# Patient Record
Sex: MALE | Race: WHITE | NOT HISPANIC OR LATINO | Employment: FULL TIME | URBAN - METROPOLITAN AREA
[De-identification: names, ages, dates, MRNs, and addresses within clinical notes are randomized per-mention and may not be internally consistent; named-entity substitution may affect disease eponyms.]

---

## 2021-04-06 ENCOUNTER — APPOINTMENT (OUTPATIENT)
Dept: RADIOLOGY | Age: 56
End: 2021-04-06
Payer: COMMERCIAL

## 2021-04-06 ENCOUNTER — OFFICE VISIT (OUTPATIENT)
Dept: URGENT CARE | Age: 56
End: 2021-04-06
Payer: COMMERCIAL

## 2021-04-06 VITALS
BODY MASS INDEX: 28.63 KG/M2 | RESPIRATION RATE: 18 BRPM | HEART RATE: 83 BPM | OXYGEN SATURATION: 99 % | WEIGHT: 200 LBS | TEMPERATURE: 97.9 F | DIASTOLIC BLOOD PRESSURE: 79 MMHG | SYSTOLIC BLOOD PRESSURE: 118 MMHG | HEIGHT: 70 IN

## 2021-04-06 DIAGNOSIS — M25.511 ACUTE PAIN OF RIGHT SHOULDER: ICD-10-CM

## 2021-04-06 DIAGNOSIS — S43.401A SPRAIN OF RIGHT SHOULDER, UNSPECIFIED SHOULDER SPRAIN TYPE, INITIAL ENCOUNTER: Primary | ICD-10-CM

## 2021-04-06 PROCEDURE — 96372 THER/PROPH/DIAG INJ SC/IM: CPT | Performed by: PHYSICIAN ASSISTANT

## 2021-04-06 PROCEDURE — 99213 OFFICE O/P EST LOW 20 MIN: CPT | Performed by: PHYSICIAN ASSISTANT

## 2021-04-06 PROCEDURE — 73030 X-RAY EXAM OF SHOULDER: CPT

## 2021-04-06 RX ORDER — KETOROLAC TROMETHAMINE 30 MG/ML
60 INJECTION, SOLUTION INTRAMUSCULAR; INTRAVENOUS ONCE
Status: COMPLETED | OUTPATIENT
Start: 2021-04-06 | End: 2021-04-06

## 2021-04-06 RX ORDER — METHYLPREDNISOLONE 4 MG/1
TABLET ORAL
Qty: 1 EACH | Refills: 0 | Status: SHIPPED | OUTPATIENT
Start: 2021-04-06 | End: 2021-09-21 | Stop reason: HOSPADM

## 2021-04-06 RX ORDER — KETOROLAC TROMETHAMINE 10 MG/1
10 TABLET, FILM COATED ORAL EVERY 8 HOURS
Qty: 15 TABLET | Refills: 0 | Status: SHIPPED | OUTPATIENT
Start: 2021-04-06 | End: 2021-09-21 | Stop reason: HOSPADM

## 2021-04-06 RX ADMIN — KETOROLAC TROMETHAMINE 60 MG: 30 INJECTION, SOLUTION INTRAMUSCULAR; INTRAVENOUS at 09:51

## 2021-04-06 NOTE — PROGRESS NOTES
[facility] [employer] [facility]        NAME: Pablo Chin is a 64 y o  male  : 1965    MRN: [id number]  DATE: 2021  TIME: 9:54 AM    Assessment and Plan   Sprain of right shoulder, unspecified shoulder sprain type, initial encounter [S43 401A]  1  Sprain of right shoulder, unspecified shoulder sprain type, initial encounter  XR shoulder 2+ vw right    ketorolac (TORADOL) injection 60 mg    methylPREDNISolone 4 MG tablet therapy pack    ketorolac (TORADOL) 10 mg tablet     Right Shoulder XRAY: Negative for acute osseous abnormality  Pending Radiologist Interpretation  Patient received Toradol 60mg IM while in clinic  Sling applied  Discussed need for Ortho f/u if no improvement  Pt stable upon d/c  Patient Instructions       Follow up with Orthopedics if symptoms do not improve  Take medications as prescribed  Wear brace/sling as directed  Proceed to  ER if symptoms worsen  Chief Complaint     Chief Complaint   Patient presents with    Shoulder Pain     pt states yesterday he "popped" his right shoulder when pulling himself by grabbing onto a pipe  decreased ROM           History of Present Illness       Patient is c/o right shoulder pain  Patient reports laying supine and attempting to grab a bar to lift himself back and felt a pop to right shoulder  Pain to right anterior shoulder since that time  Limited ROM  Pt denies previous shoulder injuries  No hx of dislocation or fracture  Shoulder Pain   The pain is present in the right shoulder  This is a new problem  The current episode started yesterday  The problem occurs constantly  The problem has been gradually worsening  The quality of the pain is described as aching  The pain is moderate  Pertinent negatives include no fever  He has tried NSAIDS for the symptoms  The treatment provided no relief  Review of Systems   Review of Systems   Constitutional: Negative for chills and fever  HENT: Negative for ear pain and sore throat  Eyes: Negative for pain and visual disturbance  Respiratory: Negative for cough and shortness of breath  Cardiovascular: Negative for chest pain and palpitations  Gastrointestinal: Negative for abdominal pain and vomiting  Genitourinary: Negative for dysuria and hematuria  Musculoskeletal: Positive for arthralgias (Right shoulder pain)  Negative for back pain  Skin: Negative for color change and rash  Neurological: Negative for seizures and syncope  All other systems reviewed and are negative  Current Medications       Current Outpatient Medications:     ketorolac (TORADOL) 10 mg tablet, Take 1 tablet (10 mg total) by mouth every 8 (eight) hours for 5 days, Disp: 15 tablet, Rfl: 0    methylPREDNISolone 4 MG tablet therapy pack, Use as directed on package, Disp: 1 each, Rfl: 0    Current Facility-Administered Medications:     ketorolac (TORADOL) injection 60 mg, 60 mg, Intramuscular, Once, Devendra Yan PA-C    Current Allergies     Allergies as of 04/06/2021    (No Known Allergies)            The following portions of the patient's history were reviewed and updated as appropriate: allergies, current medications, past family history, past medical history, past social history, past surgical history and problem list      History reviewed  No pertinent past medical history  Past Surgical History:   Procedure Laterality Date    ACHILLES TENDON SURGERY      LAMINECTOMY         History reviewed  No pertinent family history  Medications have been verified  Objective   /79   Pulse 83   Temp 97 9 °F (36 6 °C)   Resp 18   Ht 5' 10" (1 778 m)   Wt 90 7 kg (200 lb)   SpO2 99%   BMI 28 70 kg/m²   No LMP for male patient  Physical Exam     Physical Exam  Constitutional:       Appearance: Normal appearance  He is normal weight  HENT:      Head: Normocephalic and atraumatic        Nose: Nose normal       Mouth/Throat:      Mouth: Mucous membranes are moist  Eyes:      Extraocular Movements: Extraocular movements intact  Conjunctiva/sclera: Conjunctivae normal       Pupils: Pupils are equal, round, and reactive to light  Neck:      Musculoskeletal: Normal range of motion and neck supple  Cardiovascular:      Rate and Rhythm: Normal rate  Pulmonary:      Effort: Pulmonary effort is normal    Musculoskeletal: Normal range of motion  Arms:    Skin:     General: Skin is warm and dry  Neurological:      General: No focal deficit present  Mental Status: He is alert and oriented to person, place, and time     Psychiatric:         Mood and Affect: Mood normal          Behavior: Behavior normal

## 2021-04-06 NOTE — PATIENT INSTRUCTIONS
Follow up with Orthopedics if symptoms do not improve within 1 week  ED if symptoms worsen  Take medications as prescribed  Avoid any heavy lifting until symptoms improve  Wear sling as directed  Shoulder Sprain   WHAT YOU NEED TO KNOW:   A shoulder sprain happens when a ligament in your shoulder is stretched or torn  Ligaments are the tough tissues that connect bones  Ligaments allow you to lift, lower, and rotate your arm  DISCHARGE INSTRUCTIONS:   Return to the emergency department if:   · You feel severe pain in your shoulder when you move it, or it is touched  · Your skin feels cold or clammy  · You have numbness, tingling, or a feeling of pins and needles in your shoulder  · The skin on your injured shoulder looks blue or pale  Call your doctor if:   · You have new or increased swelling and pain in your shoulder  · You have new or increased stiffness when you move your injured shoulder  · Your symptoms do not improve within 5 to 7 days  · You have questions or concerns about your condition or care  Medicines: You may need any of the following:  · Acetaminophen  decreases pain and fever  It is available without a doctor's order  Ask how much to take and how often to take it  Follow directions  Read the labels of all other medicines you are using to see if they also contain acetaminophen, or ask your doctor or pharmacist  Acetaminophen can cause liver damage if not taken correctly  Do not use more than 4 grams (4,000 milligrams) total of acetaminophen in one day  · NSAIDs , such as ibuprofen, help decrease swelling, pain, and fever  This medicine is available with or without a doctor's order  NSAIDs can cause stomach bleeding or kidney problems in certain people  If you take blood thinner medicine, always ask your healthcare provider if NSAIDs are safe for you  Always read the medicine label and follow directions  · Prescription pain medicine  may be given   Ask your healthcare provider how to take this medicine safely  Some prescription pain medicines contain acetaminophen  Do not take other medicines that contain acetaminophen without talking to your healthcare provider  Too much acetaminophen may cause liver damage  Prescription pain medicine may cause constipation  Ask your healthcare provider how to prevent or treat constipation  · Take your medicine as directed  Contact your healthcare provider if you think your medicine is not helping or if you have side effects  Tell him or her if you are allergic to any medicine  Keep a list of the medicines, vitamins, and herbs you take  Include the amounts, and when and why you take them  Bring the list or the pill bottles to follow-up visits  Carry your medicine list with you in case of an emergency  Follow up with your healthcare provider as directed:  Write down your questions so you remember to ask them during your visits  Self-care:   · Rest  your shoulder so it can heal  Avoid moving your shoulder as your injury heals  This will help decrease the risk of more damage to your shoulder  · Apply ice  on your shoulder for 20 to 30 minutes every 2 hours or as directed  Use an ice pack, or put crushed ice in a plastic bag  Cover it with a towel before you apply it to your shoulder  Ice helps prevent tissue damage and decreases swelling and pain  · Compress your shoulder as directed  Compression provides support and helps decrease swelling and movement so your shoulder can heal  For mild sprains, you may be given a sling to support your arm  You may need a padded brace or a plaster cast to hold your shoulder in place if the sprain is more serious  How to wear a brace, sling, or splint:  A brace, sling, or splint may be needed to limit your movement and protect your injured shoulder  · Wear your brace, sling, or splint as directed  You may need to wear it all the time and take it off only to bathe or do exercises  Ask your healthcare provider how long you should wear it  · Keep your skin clean and dry  Padding under your armpit will help absorb sweat and prevent sores on your skin  · Do not hunch your shoulders  This may cause pain  Keep your shoulders relaxed  · Position the sling over your arm and hand so that it also covers your knuckles  This will help the sling support your wrist and hand  Position your wrist higher than your elbow  Your wrist may start to hurt or go numb if your sling is too short  Physical therapy:  A physical therapist teaches you exercises to help improve movement and strength, and to decrease pain  Prevent another injury:   · Do not exercise when you are tired or in pain  Warm up and stretch before you exercise  · Wear equipment to protect yourself when you play sports  · Wear shoes that fit well and run on flat surfaces to prevent falls  © Copyright [address] Information is for End User's use only and may not be sold, redistributed or otherwise used for commercial purposes  All illustrations and images included in CareNotes® are the copyrighted property of [employer] D [employer]  or [facility] Jaleel Zamorano   The above information is an  only  It is not intended as medical advice for individual conditions or treatments  Talk to your doctor, nurse or pharmacist before following any medical regimen to see if it is safe and effective for you

## 2021-09-19 ENCOUNTER — APPOINTMENT (EMERGENCY)
Dept: RADIOLOGY | Facility: HOSPITAL | Age: 56
End: 2021-09-19
Payer: COMMERCIAL

## 2021-09-19 ENCOUNTER — HOSPITAL ENCOUNTER (INPATIENT)
Facility: HOSPITAL | Age: 56
LOS: 1 days | Discharge: HOME | DRG: 552 | End: 2021-09-21
Attending: SURGERY | Admitting: SURGERY
Payer: COMMERCIAL

## 2021-09-19 ENCOUNTER — APPOINTMENT (EMERGENCY)
Dept: CT IMAGING | Facility: HOSPITAL | Age: 56
End: 2021-09-19
Payer: COMMERCIAL

## 2021-09-19 ENCOUNTER — HOSPITAL ENCOUNTER (EMERGENCY)
Facility: HOSPITAL | Age: 56
End: 2021-09-19
Attending: EMERGENCY MEDICINE | Admitting: EMERGENCY MEDICINE
Payer: COMMERCIAL

## 2021-09-19 ENCOUNTER — APPOINTMENT (OUTPATIENT)
Dept: RADIOLOGY | Facility: HOSPITAL | Age: 56
DRG: 552 | End: 2021-09-19
Payer: COMMERCIAL

## 2021-09-19 ENCOUNTER — APPOINTMENT (EMERGENCY)
Dept: RADIOLOGY | Facility: HOSPITAL | Age: 56
DRG: 552 | End: 2021-09-19
Payer: COMMERCIAL

## 2021-09-19 VITALS
SYSTOLIC BLOOD PRESSURE: 136 MMHG | OXYGEN SATURATION: 98 % | RESPIRATION RATE: 16 BRPM | DIASTOLIC BLOOD PRESSURE: 90 MMHG | TEMPERATURE: 97.6 F | HEART RATE: 83 BPM

## 2021-09-19 DIAGNOSIS — S12.591A OTHER CLOSED NONDISPLACED FRACTURE OF SIXTH CERVICAL VERTEBRA, INITIAL ENCOUNTER (HCC): Primary | ICD-10-CM

## 2021-09-19 DIAGNOSIS — K08.419 PARTIAL LOSS OF TOOTH DUE TO TRAUMA: ICD-10-CM

## 2021-09-19 DIAGNOSIS — S20.212A HEMATOMA OF LEFT CHEST WALL, INITIAL ENCOUNTER: ICD-10-CM

## 2021-09-19 DIAGNOSIS — S22.080A COMPRESSION FRACTURE OF T12 VERTEBRA, INITIAL ENCOUNTER (HCC): ICD-10-CM
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
## 2021-09-19 DIAGNOSIS — S02.5XXA: ICD-10-CM

## 2021-09-19 DIAGNOSIS — S12.501A CLOSED NONDISPLACED FRACTURE OF SIXTH CERVICAL VERTEBRA, UNSPECIFIED FRACTURE MORPHOLOGY, INITIAL ENCOUNTER (HCC): ICD-10-CM

## 2021-09-19 DIAGNOSIS — S22.000A COMPRESSION FRACTURE OF BODY OF THORACIC VERTEBRA (HCC): ICD-10-CM

## 2021-09-19 PROBLEM — K08.409: Status: ACTIVE | Noted: 2021-09-19

## 2021-09-19 PROBLEM — S12.500A C6 CERVICAL FRACTURE (HCC): Status: ACTIVE | Noted: 2021-09-19

## 2021-09-19 PROBLEM — T14.8XXA SUBCUTANEOUS HEMATOMA: Status: ACTIVE | Noted: 2021-09-19

## 2021-09-19 LAB
ANION GAP SERPL CALCULATED.3IONS-SCNC: 14 MMOL/L (ref 4–13)
BASOPHILS # BLD AUTO: 0.04 THOUSANDS/ΜL (ref 0–0.1)
BASOPHILS NFR BLD AUTO: 0 % (ref 0–1)
BUN SERPL-MCNC: 15 MG/DL (ref 5–25)
CALCIUM SERPL-MCNC: 9 MG/DL (ref 8.3–10.1)
CHLORIDE SERPL-SCNC: 106 MMOL/L (ref 100–108)
CO2 SERPL-SCNC: 22 MMOL/L (ref 21–32)
CREAT SERPL-MCNC: 1.03 MG/DL (ref 0.6–1.3)
EOSINOPHIL # BLD AUTO: 0.14 THOUSAND/ΜL (ref 0–0.61)
EOSINOPHIL NFR BLD AUTO: 1 % (ref 0–6)
ERYTHROCYTE [DISTWIDTH] IN BLOOD BY AUTOMATED COUNT: 13.2 % (ref 11.6–15.1)
GFR SERPL CREATININE-BSD FRML MDRD: 81 ML/MIN/1.73SQ M
GLUCOSE SERPL-MCNC: 92 MG/DL (ref 65–140)
HCT VFR BLD AUTO: 43.9 % (ref 36.5–49.3)
HGB BLD-MCNC: 15.1 G/DL (ref 12–17)
IMM GRANULOCYTES # BLD AUTO: 0.18 THOUSAND/UL (ref 0–0.2)
IMM GRANULOCYTES NFR BLD AUTO: 1 % (ref 0–2)
LYMPHOCYTES # BLD AUTO: 1.4 THOUSANDS/ΜL (ref 0.6–4.47)
LYMPHOCYTES NFR BLD AUTO: 9 % (ref 14–44)
MCH RBC QN AUTO: 31.9 PG (ref 26.8–34.3)
MCHC RBC AUTO-ENTMCNC: 34.4 G/DL (ref 31.4–37.4)
MCV RBC AUTO: 93 FL (ref 82–98)
MONOCYTES # BLD AUTO: 0.76 THOUSAND/ΜL (ref 0.17–1.22)
MONOCYTES NFR BLD AUTO: 5 % (ref 4–12)
NEUTROPHILS # BLD AUTO: 13.38 THOUSANDS/ΜL (ref 1.85–7.62)
NEUTS SEG NFR BLD AUTO: 84 % (ref 43–75)
NRBC BLD AUTO-RTO: 0 /100 WBCS
PLATELET # BLD AUTO: 317 THOUSANDS/UL (ref 149–390)
PMV BLD AUTO: 9.3 FL (ref 8.9–12.7)
POTASSIUM SERPL-SCNC: 3.4 MMOL/L (ref 3.5–5.3)
RBC # BLD AUTO: 4.74 MILLION/UL (ref 3.88–5.62)
SODIUM SERPL-SCNC: 142 MMOL/L (ref 136–145)
WBC # BLD AUTO: 15.9 THOUSAND/UL (ref 4.31–10.16)

## 2021-09-19 PROCEDURE — 73030 X-RAY EXAM OF SHOULDER: CPT

## 2021-09-19 PROCEDURE — 99285 EMERGENCY DEPT VISIT HI MDM: CPT

## 2021-09-19 PROCEDURE — 85025 COMPLETE CBC W/AUTO DIFF WBC: CPT | Performed by: PHYSICIAN ASSISTANT

## 2021-09-19 PROCEDURE — 70486 CT MAXILLOFACIAL W/O DYE: CPT

## 2021-09-19 PROCEDURE — 72125 CT NECK SPINE W/O DYE: CPT

## 2021-09-19 PROCEDURE — 96376 TX/PRO/DX INJ SAME DRUG ADON: CPT

## 2021-09-19 PROCEDURE — 36415 COLL VENOUS BLD VENIPUNCTURE: CPT | Performed by: PHYSICIAN ASSISTANT

## 2021-09-19 PROCEDURE — 90715 TDAP VACCINE 7 YRS/> IM: CPT

## 2021-09-19 PROCEDURE — 99220 PR INITIAL OBSERVATION CARE/DAY 70 MINUTES: CPT | Performed by: SURGERY

## 2021-09-19 PROCEDURE — 72040 X-RAY EXAM NECK SPINE 2-3 VW: CPT

## 2021-09-19 PROCEDURE — 71260 CT THORAX DX C+: CPT

## 2021-09-19 PROCEDURE — 70450 CT HEAD/BRAIN W/O DYE: CPT

## 2021-09-19 PROCEDURE — 71045 X-RAY EXAM CHEST 1 VIEW: CPT

## 2021-09-19 PROCEDURE — 99285 EMERGENCY DEPT VISIT HI MDM: CPT | Performed by: PHYSICIAN ASSISTANT

## 2021-09-19 PROCEDURE — 74177 CT ABD & PELVIS W/CONTRAST: CPT

## 2021-09-19 PROCEDURE — 80048 BASIC METABOLIC PNL TOTAL CA: CPT | Performed by: PHYSICIAN ASSISTANT

## 2021-09-19 PROCEDURE — 70498 CT ANGIOGRAPHY NECK: CPT

## 2021-09-19 PROCEDURE — 96374 THER/PROPH/DIAG INJ IV PUSH: CPT

## 2021-09-19 RX ORDER — FENTANYL CITRATE 50 UG/ML
50 INJECTION, SOLUTION INTRAMUSCULAR; INTRAVENOUS ONCE
Status: COMPLETED | OUTPATIENT
Start: 2021-09-19 | End: 2021-09-19

## 2021-09-19 RX ORDER — HYDROMORPHONE HCL/PF 1 MG/ML
1 SYRINGE (ML) INJECTION ONCE
Status: COMPLETED | OUTPATIENT
Start: 2021-09-19 | End: 2021-09-19

## 2021-09-19 RX ORDER — FENTANYL CITRATE 50 UG/ML
25 INJECTION, SOLUTION INTRAMUSCULAR; INTRAVENOUS ONCE
Status: COMPLETED | OUTPATIENT
Start: 2021-09-19 | End: 2021-09-19

## 2021-09-19 RX ORDER — OXYCODONE HYDROCHLORIDE 5 MG/1
5 TABLET ORAL EVERY 4 HOURS PRN
Status: DISCONTINUED | OUTPATIENT
Start: 2021-09-19 | End: 2021-09-21 | Stop reason: HOSPADM

## 2021-09-19 RX ORDER — OXYCODONE HYDROCHLORIDE 10 MG/1
10 TABLET ORAL EVERY 4 HOURS PRN
Status: DISCONTINUED | OUTPATIENT
Start: 2021-09-19 | End: 2021-09-21 | Stop reason: HOSPADM

## 2021-09-19 RX ORDER — ACETAMINOPHEN 325 MG/1
975 TABLET ORAL EVERY 8 HOURS SCHEDULED
Status: DISCONTINUED | OUTPATIENT
Start: 2021-09-19 | End: 2021-09-21 | Stop reason: HOSPADM

## 2021-09-19 RX ORDER — ONDANSETRON 2 MG/ML
4 INJECTION INTRAMUSCULAR; INTRAVENOUS EVERY 6 HOURS PRN
Status: DISCONTINUED | OUTPATIENT
Start: 2021-09-19 | End: 2021-09-21 | Stop reason: HOSPADM

## 2021-09-19 RX ORDER — METHOCARBAMOL 500 MG/1
500 TABLET, FILM COATED ORAL EVERY 6 HOURS SCHEDULED
Status: DISCONTINUED | OUTPATIENT
Start: 2021-09-19 | End: 2021-09-21

## 2021-09-19 RX ORDER — NICOTINE 21 MG/24HR
1 PATCH, TRANSDERMAL 24 HOURS TRANSDERMAL DAILY
Status: DISCONTINUED | OUTPATIENT
Start: 2021-09-19 | End: 2021-09-21 | Stop reason: HOSPADM

## 2021-09-19 RX ORDER — HYDROMORPHONE HCL/PF 1 MG/ML
0.5 SYRINGE (ML) INJECTION
Status: DISCONTINUED | OUTPATIENT
Start: 2021-09-19 | End: 2021-09-21 | Stop reason: HOSPADM

## 2021-09-19 RX ADMIN — IOHEXOL 100 ML: 350 INJECTION, SOLUTION INTRAVENOUS at 13:48

## 2021-09-19 RX ADMIN — IODIXANOL 85 ML: 320 INJECTION, SOLUTION INTRAVASCULAR at 17:59

## 2021-09-19 RX ADMIN — ENOXAPARIN SODIUM 30 MG: 30 INJECTION SUBCUTANEOUS at 21:01

## 2021-09-19 RX ADMIN — FENTANYL CITRATE 25 MCG: 50 INJECTION INTRAMUSCULAR; INTRAVENOUS at 13:34

## 2021-09-19 RX ADMIN — TETANUS TOXOID, REDUCED DIPHTHERIA TOXOID AND ACELLULAR PERTUSSIS VACCINE, ADSORBED 0.5 ML: 5; 2.5; 8; 8; 2.5 SUSPENSION INTRAMUSCULAR at 21:01

## 2021-09-19 RX ADMIN — OXYCODONE HYDROCHLORIDE 10 MG: 10 TABLET ORAL at 19:50

## 2021-09-19 RX ADMIN — ONDANSETRON 4 MG: 2 INJECTION INTRAMUSCULAR; INTRAVENOUS at 17:37

## 2021-09-19 RX ADMIN — FENTANYL CITRATE 50 MCG: 50 INJECTION INTRAMUSCULAR; INTRAVENOUS at 14:30

## 2021-09-19 RX ADMIN — HYDROMORPHONE HYDROCHLORIDE 1 MG: 1 INJECTION, SOLUTION INTRAMUSCULAR; INTRAVENOUS; SUBCUTANEOUS at 17:38

## 2021-09-19 RX ADMIN — ACETAMINOPHEN 975 MG: 325 TABLET, FILM COATED ORAL at 19:50

## 2021-09-19 NOTE — ED PROVIDER NOTES
History  Chief Complaint   Patient presents with    Fall     Pt reports falling 15ft out of tree stand  -LOC, c/o L shoulder pain, missing bottom tooth, sob     Patient is a 79-year-old male with no significant past medical history who presents with fall at approximately 11:00 a m  Today  Patient states he was putting a cover over his tree stand when the strap broke and he fell approximately 15 ft straight to the ground  Patient is unsure exactly how he fell, but states he noted his left lower tooth was almost completely out, so he removed it and threw it into the wounds  He denies any LOC, headache, dizziness, lightheadedness, blood thinners, vision changes, nausea, vomiting  He notes neck pain that is worse with movement as well as left shoulder pain  He notes intermittent numbness in his left arm, but denies any weakness, tingling, decreased ROM of the left arm  He also notes low back pain across the bilateral lower back with no radiation of the pain  He denies any numbness, tingling, weakness of the legs, saddle anesthesia, loss of bowel or bladder function  Patient was able to ambulate after the incident  Patient denies any other injuries  He states he feels as though he is panting, but denies any chest pain, difficulty breathing, abdominal pain  Patient has not taken anything to help alleviate his symptoms  Patient denies any prodromal symptoms and states he is otherwise in his usual state of health  He denies any fevers, chills, diaphoresis, congestion, cough, diarrhea, urinary changes, rash  Prior to Admission Medications   Prescriptions Last Dose Informant Patient Reported?  Taking?   ketorolac (TORADOL) 10 mg tablet   No No   Sig: Take 1 tablet (10 mg total) by mouth every 8 (eight) hours for 5 days   methylPREDNISolone 4 MG tablet therapy pack Not Taking at Unknown time  No No   Sig: Use as directed on package   Patient not taking: Reported on 9/19/2021      Facility-Administered Medications: None       No past medical history on file  Past Surgical History:   Procedure Laterality Date    ACHILLES TENDON SURGERY      LAMINECTOMY         No family history on file  I have reviewed and agree with the history as documented  E-Cigarette/Vaping    E-Cigarette Use Never User      E-Cigarette/Vaping Substances    Nicotine No     THC No     CBD No     Flavoring No     Other No     Unknown No      Social History     Tobacco Use    Smoking status: Current Every Day Smoker     Packs/day: 1 00     Types: Cigarettes    Smokeless tobacco: Never Used   Vaping Use    Vaping Use: Never used   Substance Use Topics    Alcohol use: Not Currently    Drug use: Never       Review of Systems   Constitutional: Negative for chills, diaphoresis and fever  HENT: Positive for dental problem (lost lower tooth)  Negative for congestion, ear pain, rhinorrhea, sore throat and trouble swallowing  Eyes: Negative for visual disturbance  Respiratory: Positive for shortness of breath (feels like "i'm panting")  Negative for cough, wheezing and stridor  Cardiovascular: Negative for chest pain, palpitations and leg swelling  Gastrointestinal: Negative for abdominal pain, diarrhea, nausea and vomiting  Genitourinary: Negative for difficulty urinating, dysuria, frequency, hematuria and urgency  Musculoskeletal: Positive for arthralgias (left shoulder pain), back pain and neck pain  Negative for myalgias and neck stiffness  Skin: Negative for color change, pallor and rash  Neurological: Negative for dizziness, weakness, light-headedness, numbness and headaches  All other systems reviewed and are negative  Physical Exam  Physical Exam  Vitals and nursing note reviewed  Constitutional:       General: He is awake  He is not in acute distress  Appearance: He is well-developed  He is not toxic-appearing or diaphoretic  Interventions: Cervical collar in place        Comments: Patient's initial assessment airway intact, speaking in full sentences  Regular breathing with equal chest rise  Circulation intact with good cap refill in all extremities  After initial ABC assessment, full survey was completed  Patient has AAOx4   HENT:      Head: Normocephalic and atraumatic  No raccoon eyes, Sweeney's sign, abrasion, contusion, masses or laceration  Right Ear: Hearing, tympanic membrane, ear canal and external ear normal  No hemotympanum  Left Ear: Hearing, tympanic membrane, ear canal and external ear normal  No hemotympanum  Nose: Nose normal       Mouth/Throat:      Mouth: Mucous membranes are moist  Injury present  No lacerations  Dentition: Abnormal dentition  Pharynx: Oropharynx is clear  Comments: Tenderness along the superior mandible just below the teeth, near the location of the lost tooth  Eyes:      General: Vision grossly intact  Extraocular Movements: Extraocular movements intact  Conjunctiva/sclera: Conjunctivae normal       Pupils: Pupils are equal, round, and reactive to light  Cardiovascular:      Rate and Rhythm: Normal rate and regular rhythm  Pulses: Normal pulses  Heart sounds: Normal heart sounds, S1 normal and S2 normal    Pulmonary:      Effort: Pulmonary effort is normal  No respiratory distress  Breath sounds: No stridor  Examination of the right-lower field reveals decreased breath sounds  Decreased breath sounds present  No wheezing, rhonchi or rales  Comments: Mildly decreased breath sounds in the left base, improved with encouragement of deep breathing  Chest:      Chest wall: Lacerations (abrasion, see pulm comment) present  No deformity, crepitus or edema  Abdominal:      General: Bowel sounds are normal  There is no distension  Palpations: Abdomen is soft  Tenderness: There is no abdominal tenderness        Comments: No ecchymosis or signs of trauma   Musculoskeletal: General: Normal range of motion  Right shoulder: Normal       Left shoulder: Tenderness present  No swelling, deformity, effusion, laceration or bony tenderness  Normal range of motion  Normal strength  Normal pulse  Right upper arm: Normal       Left upper arm: Normal       Right elbow: Normal       Left elbow: Normal       Right forearm: Normal       Left forearm: Normal       Right wrist: Normal       Left wrist: Normal       Right hand: Normal       Left hand: Normal       Thoracic back: Normal       Lumbar back: Normal         Back:       Right hip: Normal       Left hip: Normal       Right upper leg: Normal       Left upper leg: Normal       Right knee: Normal       Left knee: Normal       Right lower leg: Normal       Left lower leg: Normal       Right ankle: Normal       Left ankle: Normal       Right foot: Normal       Left foot: Normal       Comments: No midline tenderness, step-off deformity, swelling, erythema noted of thoracolumbar spine  Skin:     General: Skin is warm and dry  Capillary Refill: Capillary refill takes less than 2 seconds  Neurological:      General: No focal deficit present  Mental Status: He is alert and oriented to person, place, and time  GCS: GCS eye subscore is 4  GCS verbal subscore is 5  GCS motor subscore is 6  Cranial Nerves: Cranial nerves are intact  Sensory: Sensation is intact  Comments: Patient indicates numbness in his left arm, but sensation grossly intact  Psychiatric:         Behavior: Behavior is cooperative           Vital Signs  ED Triage Vitals   Temperature Pulse Respirations Blood Pressure SpO2   09/19/21 1533 09/19/21 1253 09/19/21 1253 09/19/21 1254 09/19/21 1253   97 6 °F (36 4 °C) 70 21 (!) 190/134 99 %      Temp Source Heart Rate Source Patient Position - Orthostatic VS BP Location FiO2 (%)   09/19/21 1533 09/19/21 1253 09/19/21 1426 09/19/21 1426 --   Oral Monitor Lying Right arm       Pain Score       09/19/21 1253       Worst Possible Pain           Vitals:    09/19/21 1254 09/19/21 1315 09/19/21 1426 09/19/21 1533   BP: (!) 190/134 142/94 137/89 136/90   Pulse:  80 89 83   Patient Position - Orthostatic VS:   Lying Lying         Visual Acuity  Visual Acuity      Most Recent Value   L Pupil Size (mm)  3   R Pupil Size (mm)  3          ED Medications  Medications   fentanyl citrate (PF) 100 MCG/2ML 25 mcg (25 mcg Intravenous Given 9/19/21 1334)   iohexol (OMNIPAQUE) 350 MG/ML injection (SINGLE-DOSE) 100 mL (100 mL Intravenous Given 9/19/21 1348)   fentanyl citrate (PF) 100 MCG/2ML 50 mcg (50 mcg Intravenous Given 9/19/21 1430)       Diagnostic Studies  Results Reviewed     Procedure Component Value Units Date/Time    Basic metabolic panel [[id number]]  (Abnormal) Collected: 09/19/21 1337    Lab Status: Final result Specimen: Blood from Arm, Left Updated: 09/19/21 1355     Sodium 142 mmol/L      Potassium 3 4 mmol/L      Chloride 106 mmol/L      CO2 22 mmol/L      ANION GAP 14 mmol/L      BUN 15 mg/dL      Creatinine 1 03 mg/dL      Glucose 92 mg/dL      Calcium 9 0 mg/dL      eGFR 81 ml/min/1 73sq m     Narrative:      Jaswant guidelines for Chronic Kidney Disease (CKD):     Stage 1 with normal or high GFR (GFR > 90 mL/min/1 73 square meters)    Stage 2 Mild CKD (GFR = 60-89 mL/min/1 73 square meters)    Stage 3A Moderate CKD (GFR = 45-59 mL/min/1 73 square meters)    Stage 3B Moderate CKD (GFR = 30-44 mL/min/1 73 square meters)    Stage 4 Severe CKD (GFR = 15-29 mL/min/1 73 square meters)    Stage 5 End Stage CKD (GFR <15 mL/min/1 73 square meters)  Note: GFR calculation is accurate only with a steady state creatinine    CBC and differential [[id number]]  (Abnormal) Collected: 09/19/21 1337    Lab Status: Final result Specimen: Blood from Arm, Left Updated: 09/19/21 1344     WBC 15 90 Thousand/uL      RBC 4 74 Million/uL      Hemoglobin 15 1 g/dL      Hematocrit 43 9 %      MCV 93 fL MCH 31 9 pg      MCHC 34 4 g/dL      RDW 13 2 %      MPV 9 3 fL      Platelets 106 Thousands/uL      nRBC 0 /100 WBCs      Neutrophils Relative 84 %      Immat GRANS % 1 %      Lymphocytes Relative 9 %      Monocytes Relative 5 %      Eosinophils Relative 1 %      Basophils Relative 0 %      Neutrophils Absolute 13 38 Thousands/µL      Immature Grans Absolute 0 18 Thousand/uL      Lymphocytes Absolute 1 40 Thousands/µL      Monocytes Absolute 0 76 Thousand/µL      Eosinophils Absolute 0 14 Thousand/µL      Basophils Absolute 0 04 Thousands/µL                  CT head without contrast   Final Result by Carolyne Duran MD (09/19 1401)      No acute intracranial abnormality  Workstation performed: [id number]         CT facial bones without contrast   Final Result by Carolyne Duran MD (09/19 1409)      Missing mandibular left central incisor tooth with tiny locules of gas in the tooth socket, likely from recent trauma  No acute maxillofacial fracture  Old depressed right medial orbital wall fracture with orbital fat herniation  Workstation performed: [id number]         CT spine cervical without contrast   Final Result by Carolyne Duran MD (09/19 1447)   Addendum 2 of 2 by Carolyne Duran MD (09/19 1447)   ADDENDUM:      I personally discussed this study with Nancy Jimenez on 9/19/2021 at    2:44 PM          Final      - No cervical spine fracture or traumatic malalignment  - Probable focal enthesitis posterior to C6 spinous process  Given tiny locule of gas in this region, infection or tiny radiolucent foreign body should be considered  The study was marked in [facility] for immediate notification  Workstation performed: [id number]         CT chest abdomen pelvis w contrast   Final Result by Carolyne Duran MD (09/19 1446)      No acute visceral organ or vascular injury of the chest, abdomen, or pelvis  Multiple acute fractures:   - Acute anterior superior corner fracture of C6 vertebral body  - Acute nondisplaced fracture of left C6 articular pillar  - Acute T12 superior endplate compression fracture with mild height loss  Small subcutaneous hematoma in left anterolateral chest region  I personally discussed this study with Rehan Goode on 9/19/2021 at 2:44 PM                Workstation performed: [id number]         CT recon only lumbar spine   Final Result by Fariba Estrada MD (09/19 1446)   Addendum 1 of 1 by Fariba Estrada MD (09/19 1446)   ADDENDUM:      I personally discussed this study with Rehan Rupa on 9/19/2021 at    2:44 PM          Final      Acute T12 superior endplate compression fracture with mild vertebral body height loss  No acute osseous abnormality of lumbar spine  Multilevel degenerative changes of lumbar spine, severe at L4-L5  Please see dedicated CT chest abdomen pelvis with contrast for further evaluation  Workstation performed: [id number]         CT recon only thoracolumbar (No Charge)   Final Result by Fariba Estrada MD (09/19 1446)   Addendum 1 of 1 by Fariba Estrada MD (09/19 1446)   ADDENDUM:      I personally discussed this study with Rehan Rupa on 9/19/2021 at    2:44 PM          Final      Acute T12 superior endplate compression fracture with mild vertebral body height loss  Workstation performed: [id number]         XR shoulder 2+ views LEFT    (Results Pending)   XR chest 1 view portable    (Results Pending)              Procedures  Procedures         ED Course  ED Course as of Sep 19 1624   Sun Sep 19, 2021   1403 IMPRESSION:     No acute intracranial abnormality     CT head without contrast   1414 IMPRESSION:     Missing mandibular left central incisor tooth with tiny locules of gas in the tooth socket, likely from recent trauma      No acute maxillofacial fracture      Old depressed right medial orbital wall fracture with orbital fat herniation  CT facial bones without contrast   1447 IMPRESSION:     Acute T12 superior endplate compression fracture with mild vertebral body height loss      No acute osseous abnormality of lumbar spine      Multilevel degenerative changes of lumbar spine, severe at L4-L5        Please see dedicated CT chest abdomen pelvis with contrast for further evaluation  CT recon only lumbar spine   1447 ADDENDUM: Upon further review,  -Acute anterior superior corner fracture of the left C6 vertebral body (602:86)  -Acute nondisplaced fracture of left C6 articular pillar (602:69)  IMPRESSION:     - No cervical spine fracture or traumatic malalignment      - Probable focal enthesitis posterior to C6 spinous process  Given tiny locule of gas in this region, infection or tiny radiolucent foreign body should be considered  CT spine cervical without contrast   1448 IMPRESSION:     No acute visceral organ or vascular injury of the chest, abdomen, or pelvis      Multiple acute fractures:  - Acute anterior superior corner fracture of C6 vertebral body  - Acute nondisplaced fracture of left C6 articular pillar  - Acute T12 superior endplate compression fracture with mild height loss      Small subcutaneous hematoma in left anterolateral chest region  CT chest abdomen pelvis w contrast   1455 Reviewed all results with patient and wife at bedside, answered questions  They are agreeable to plan for transfer to [facility]  Patient notes improvement of pain after fentanyl      1515 Spoke with Dr Ok Delgado with Trauma, reviewed case in ED course  He accepts as transfer to [facility]    Disposition  Final diagnoses:   Fall from height of greater than 3 feet   Closed nondisplaced fracture of sixth cervical vertebra, unspecified fracture morphology, initial encounter ([facility])   Compression fracture of T12 vertebra, initial encounter ([facility] )   Partial loss of tooth due to trauma   Hematoma of left chest wall, initial encounter     Time reflects when diagnosis was documented in both MDM as applicable and the Disposition within this note     Time User Action Codes Description Comment    9/19/2021  2:58 PM West Read Add [W19  JGJT] Fall, initial encounter     9/19/2021  2:58 PM CostJovany quintana [M36  GBFU] Fall, initial encounter     9/19/2021  2:58 PM Costlow, Ugo Go Add [N90 04SD] Fall from height of greater than 3 feet     9/19/2021  2:59 PM Costlow, Ugo Go Add [S12 501A] Closed nondisplaced fracture of sixth cervical vertebra, unspecified fracture morphology, initial encounter ([place] [address] )     9/19/2021  2:59 PM Costlow, Ugo Go Add [U32 902E] Compression fracture of T12 vertebra, initial encounter ([place] [address] )     9/19/2021  3:00 PM Costlow, Ugo Go Add [T42 973] Partial loss of tooth due to trauma     9/19/2021  3:01 PM Costlow, Ugo Go Add [S20 212A] Contusion of left chest wall, initial encounter     9/19/2021  3:01 PM Costlow, Ugo Go Remove [S20 212A] Contusion of left chest wall, initial encounter     9/19/2021  3:02 PM Costlow, Ugo Go Add [S20 212A] Hematoma of left chest wall, initial encounter       ED Disposition     ED Disposition Condition Date/Time Comment    Transfer to Another Facility-In Network  Sun Sep 19, 2021  3:15 PM Pedro Block should be transferred out to [facility]          MD Documentation      Most Recent Value   Patient Condition  The patient has been stabilized such that within reasonable medical probability, no material deterioration of the patient condition or the condition of the unborn child(marga) is likely to result from the transfer   Reason for Transfer  Level of Care needed not available at this facility   Benefits of Transfer  Specialized equipment and/or services available at the receiving facility (Include comment)________________________ [Trauma]   Risks of Transfer  Potential for delay in receiving treatment, Potential deterioration of medical condition, Loss of IV, Increased discomfort during transfer, Possible worsening of condition or death during transfer   Accepting Physician  Dr Gay Clemons Name, Nory Ventura   Sending MD Lisa Wolfe, [facility]   Provider Certification  General risk, such as traffic hazards, adverse weather conditions, rough terrain or turbulence, possible failure of equipment (including vehicle or aircraft), or consequences of actions of persons outside the control of the transport personnel      RN Documentation      [facility] Name, Nory Ventura      Follow-up Information    None         Discharge Medication List as of 9/19/2021  4:10 PM      CONTINUE these medications which have NOT CHANGED    Details   ketorolac (TORADOL) 10 mg tablet Take 1 tablet (10 mg total) by mouth every 8 (eight) hours for 5 days, Starting Tue 4/6/2021, Until Sun 4/11/2021, Normal      methylPREDNISolone 4 MG tablet therapy pack Use as directed on package, Normal           No discharge procedures on file      PDMP Review     None          ED Provider  Electronically Signed by           Ellen Rodriguez PA-C  09/19/21 3910

## 2021-09-19 NOTE — ASSESSMENT & PLAN NOTE
Small subcutaneous hematoma in left anterolateral chest region  · Continue to monitor  · AM CBC - Hgb stable at 15

## 2021-09-19 NOTE — ED NOTES
Transfer to [facility]  Accepting by Dr Markus Lerma Rkp  [address] [facility] @ [id number] [place]  09/19/21 2029

## 2021-09-19 NOTE — EMTALA/ACUTE CARE TRANSFER
[facility] [address]  [address] [id number]  Dept: [phone]      EMTALA TRANSFER CONSENT    NAME Gabriele Desir                                         1965                              MRN [id number]    I have been informed of my rights regarding examination, treatment, and transfer   by Dr Mary Sanders DO    Benefits: Specialized equipment and/or services available at the receiving facility (Include comment)________________________ (Trauma)    Risks: Potential for delay in receiving treatment, Potential deterioration of medical condition, Loss of IV, Increased discomfort during transfer, Possible worsening of condition or death during transfer      Consent for Transfer:  I acknowledge that my medical condition has been evaluated and explained to me by the emergency department physician or other qualified medical person and/or my attending physician, who has recommended that I be transferred to the service of  Accepting Physician: Dr Mika Espinoza at [facility] [address], [address] : [address]  The above potential benefits of such transfer, the potential risks associated with such transfer, and the probable risks of not being transferred have been explained to me, and I fully understand them  The doctor has explained that, in my case, the benefits of transfer outweigh the risks  I agree to be transferred  I authorize the performance of emergency medical procedures and treatments upon me in both transit and upon arrival at the receiving facility  Additionally, I authorize the release of any and all medical records to the receiving facility and request they be transported with me, if possible  I understand that the safest mode of transportation during a medical emergency is an ambulance and that the Hospital advocates the use of this mode of transport   Risks of traveling to the receiving facility by car, including absence of medical control, life sustaining equipment, such as oxygen, and medical personnel has been explained to me and I fully understand them  (TISHA CORRECT BOX BELOW)  [  ]  I consent to the stated transfer and to be transported by ambulance/helicopter  [  ]  I consent to the stated transfer, but refuse transportation by ambulance and accept full responsibility for my transportation by car  I understand the risks of non-ambulance transfers and I exonerate the Hospital and its staff from any deterioration in my condition that results from this refusal     X___________________________________________    DATE  21  TIME________  Signature of patient or legally responsible individual signing on patient behalf           RELATIONSHIP TO PATIENT_________________________          Provider Certification    NAME Odette HORTON 1965                              MRN [id number]    A medical screening exam was performed on the above named patient  Based on the examination:    Condition Necessitating Transfer The primary encounter diagnosis was Fall from height of greater than 3 feet  Diagnoses of Closed nondisplaced fracture of sixth cervical vertebra, unspecified fracture morphology, initial encounter ([facility] [address] ), Compression fracture of T12 vertebra, initial encounter ([facility] [address] ), Partial loss of tooth due to trauma, and Hematoma of left chest wall, initial encounter were also pertinent to this visit      Patient Condition: The patient has been stabilized such that within reasonable medical probability, no material deterioration of the patient condition or the condition of the unborn child(marga) is likely to result from the transfer    Reason for Transfer: Level of Care needed not available at this facility    Transfer Requirements: Luis Gramajo   · Space available and qualified personnel available for treatment as acknowledged by    · Agreed to accept transfer and to provide appropriate medical treatment as acknowledged by       Dr Vega Mcghee  · Appropriate medical records of the examination and treatment of the patient are provided at the time of transfer   [facility], [address] _______  · Transfer will be performed by qualified personnel from    and appropriate transfer equipment as required, including the use of necessary and appropriate life support measures  Provider Certification: I have examined the patient and explained the following risks and benefits of being transferred/refusing transfer to the patient/family:  General risk, such as traffic hazards, adverse weather conditions, rough terrain or turbulence, possible failure of equipment (including vehicle or aircraft), or consequences of actions of persons outside the control of the transport personnel      Based on these reasonable risks and benefits to the patient and/or the unborn child(marga), and based upon the information available at the time of the patients examination, I certify that the medical benefits reasonably to be expected from the provision of appropriate medical treatments at another medical facility outweigh the increasing risks, if any, to the individuals medical condition, and in the case of labor to the unborn child, from effecting the transfer      X____________________________________________ DATE 09/19/21        TIME_______      ORIGINAL - SEND TO MEDICAL RECORDS   COPY - SEND WITH PATIENT DURING TRANSFER <<-----Click on this checkbox to enter Post-Op Dx

## 2021-09-19 NOTE — H&P
H&P Exam - Trauma   Odette Hardwick 64 y o  male MRN: [id number]  Unit/Bed#: ED 16 Encounter: [id number]    Assessment/Plan   Trauma Alert: Evaluation  Model of Arrival: Transfer [facility]  Trauma Team: Attending Markus Lucas and Residents Handspiker  Consultants: Neurosurgery    Trauma Active Problems:   T12 superior end plate compression fracture   Acute anterior superior corner fracture of left C6 body  Non displaced fracture of left C6 articular pillar   Chest wall hematoma    Trauma Plan:   · Admit to trauma  · Images prior to transfer reviewed  · CTA Neck w/ contrast  · Admit level 2 Stepdown, HOT protocol  · Consult neurosurgery  · C Spine collar, C spine collar precautions  · Tetanus  · Consult OMFS    Chief Complaint: Neck pain, left shoulder pain, lower back pain     History of Present Illness   HPI:  Odette Hardwick is a 64 y o  male who presents as a transfer from [facility] after falling 15 ft straight to ground today  Unknown head impact, denies LOC  Patient states he stood up after falling and felt winded  Endorses neck pain worse with movement, back pain worse with movement and severe, constant left shoulder pain  He is able to move his shoulder in all directions  He additionally is noted to have lost his left lower tooth  He denies chest pain, SOB, headache, abd pain, difficulty breathing  He otherwise has no known medical problems, does not take medication at home  He is an active smoker (1pk/day)  Mechanism:Fall    Review of Systems   Constitutional: Negative for activity change, appetite change, fatigue and unexpected weight change  HENT: Positive for dental problem  Negative for congestion, ear discharge, facial swelling, rhinorrhea, sinus pain, sore throat, trouble swallowing and voice change  Eyes: Negative for photophobia, pain, redness and visual disturbance  Respiratory: Negative for cough, chest tightness, shortness of breath and wheezing      Cardiovascular: Negative for chest pain, palpitations and leg swelling  Gastrointestinal: Negative for abdominal distention, abdominal pain, nausea and vomiting  Genitourinary: Negative  Musculoskeletal: Positive for back pain, neck pain and neck stiffness  Negative for arthralgias, gait problem and myalgias  Neurological: Negative for dizziness, speech difficulty, weakness, light-headedness, numbness and headaches  Psychiatric/Behavioral: Negative for agitation, behavioral problems and confusion  All other systems reviewed and are negative  12-point, complete review of systems was reviewed and negative except as stated above  Historical Information   History is unobtainable from the patient due to N/A  History reviewed  No pertinent past medical history  Past Surgical History:   Procedure Laterality Date    ACHILLES TENDON SURGERY      LAMINECTOMY       Social History   Social History     Substance and Sexual Activity   Alcohol Use Not Currently     Social History     Substance and Sexual Activity   Drug Use Never     Social History     Tobacco Use   Smoking Status Current Every Day Smoker    Packs/day: 1 00    Types: Cigarettes   Smokeless Tobacco Never Used     E-Cigarette/Vaping    E-Cigarette Use Never User      E-Cigarette/Vaping Substances    Nicotine No     THC No     CBD No     Flavoring No     Other No     Unknown No        There is no immunization history on file for this patient  Last Tetanus:  On admission   Family History: Non-contributory    Meds/Allergies   all current active meds have been reviewed, current meds:   Current Facility-Administered Medications   Medication Dose Route Frequency    acetaminophen (TYLENOL) tablet 975 mg  975 mg Oral Q8H [facility]    HYDROmorphone (DILAUDID) injection 0 5 mg  0 5 mg Intravenous Q1H PRN    HYDROmorphone (DILAUDID) injection 1 mg  1 mg Intravenous Once    methocarbamol (ROBAXIN) tablet 500 mg  500 mg Oral Q6H [facility]    [START ON 9/20/2021] nicotine (Mal Conner) 21 mg/24 hr TD 24 hr patch 1 patch  1 patch Transdermal Daily    ondansetron (ZOFRAN) injection 4 mg  4 mg Intravenous Q6H PRN    oxyCODONE (ROXICODONE) immediate release tablet 10 mg  10 mg Oral Q4H PRN    oxyCODONE (ROXICODONE) IR tablet 5 mg  5 mg Oral Q4H PRN    and PTA meds:   Prior to Admission Medications   Prescriptions Last Dose Informant Patient Reported? Taking?   ketorolac (TORADOL) 10 mg tablet   No No   Sig: Take 1 tablet (10 mg total) by mouth every 8 (eight) hours for 5 days   methylPREDNISolone 4 MG tablet therapy pack   No No   Sig: Use as directed on package   Patient not taking: Reported on 9/19/2021      Facility-Administered Medications: None       No Known Allergies      PHYSICAL EXAM    PE limited by: None    Objective   Vitals:   First set: Temperature: (!) 97 4 °F (36 3 °C) (09/19/21 1630)  Pulse: 86 (09/19/21 1630)  Respirations: 16 (09/19/21 1630)  Blood Pressure: 148/91 (09/19/21 1630)    Primary Survey:   (A) Airway: Intact  (B) Breathing: Bilateral breath sounds  (C) Circulation: Pulses:   normal  (D) Disabliity:  GCS Total:  15  (E) Expose:  N/A    Secondary Survey: (Click on Physical Exam tab above)  Physical Exam  Vitals and nursing note reviewed  Constitutional:       General: He is not in acute distress  Appearance: Normal appearance  He is not ill-appearing or toxic-appearing  Interventions: Cervical collar in place  HENT:      Head: Normocephalic and atraumatic  No raccoon eyes, Sweeney's sign, right periorbital erythema, left periorbital erythema or laceration  Right Ear: Hearing and external ear normal       Left Ear: Hearing and external ear normal       Nose: Nose normal       Mouth/Throat:      Mouth: Mucous membranes are moist  Injury present  Dentition: Abnormal dentition  Eyes:      General: Lids are normal  Vision grossly intact  Gaze aligned appropriately  Extraocular Movements: Extraocular movements intact        Conjunctiva/sclera: Conjunctivae normal       Pupils: Pupils are equal, round, and reactive to light  Visual Fields: Right eye visual fields normal and left eye visual fields normal    Neck:      Comments: C-Spine collar  Cardiovascular:      Rate and Rhythm: Normal rate and regular rhythm  Pulses: Normal pulses  Pulmonary:      Effort: Pulmonary effort is normal  No tachypnea or respiratory distress  Abdominal:      General: Abdomen is flat  There is no distension  Palpations: Abdomen is soft  Tenderness: There is no abdominal tenderness  There is no guarding or rebound  Musculoskeletal:         General: Normal range of motion  Right shoulder: Normal       Left shoulder: Tenderness present  No swelling, laceration or crepitus  Right upper arm: Normal       Left upper arm: Laceration (superficial abrasion/ecchymosis over tricep) present  Right elbow: Normal       Left elbow: Normal       Right forearm: Normal       Left forearm: Normal       Right wrist: Normal       Left wrist: Normal       Thoracic back: Tenderness present  Lumbar back: Normal       Right lower leg: Normal  No edema  Left lower leg: Normal  No edema  Skin:     General: Skin is warm and dry  Coloration: Skin is not jaundiced  Neurological:      General: No focal deficit present  Mental Status: He is alert and oriented to person, place, and time  Mental status is at baseline  GCS: GCS eye subscore is 4  GCS verbal subscore is 5  GCS motor subscore is 6  Sensory: Sensory deficit (Left arm numbness) present  Motor: Motor function is intact  Psychiatric:         Mood and Affect: Mood normal          Behavior: Behavior normal  Behavior is cooperative  Thought Content:  Thought content normal          Judgment: Judgment normal        Invasive Devices     Peripheral Intravenous Line            Peripheral IV 09/19/21 Distal;Left;Upper;Ventral (anterior) Arm <1 day    Peripheral IV 09/19/21 Right Arm <1 day                Lab Results:   BMP/CMP:   Lab Results   Component Value Date    SODIUM 142 09/19/2021    K 3 4 (L) 09/19/2021     09/19/2021    CO2 22 09/19/2021    BUN 15 09/19/2021    CREATININE 1 03 09/19/2021    CALCIUM 9 0 09/19/2021    EGFR 81 09/19/2021    and CBC:   Lab Results   Component Value Date    WBC 15 90 (H) 09/19/2021    HGB 15 1 09/19/2021    HCT 43 9 09/19/2021    MCV 93 09/19/2021     09/19/2021    MCH 31 9 09/19/2021    MCHC 34 4 09/19/2021    RDW 13 2 09/19/2021    MPV 9 3 09/19/2021    NRBC 0 09/19/2021     Imaging/EKG Studies: CT head, facial bones, CAP, XR left shoulder  Other Studies: None

## 2021-09-19 NOTE — ASSESSMENT & PLAN NOTE
Acute anterior superior corner fracture of the left C6 vertebral body  Acute nondisplaced fracture of left C6 articular pillar   - Neurosurgery consulted  - Bracing: Maintain cervical collar   - Spine precautions  - Monitor neurovascular exam   - Multimodal analgesic regimen as needed  - PT and OT evaluation and treatment as indicated  - Outpatient follow up with Neurosurgery for re-evaluation

## 2021-09-20 ENCOUNTER — APPOINTMENT (INPATIENT)
Dept: RADIOLOGY | Facility: HOSPITAL | Age: 56
DRG: 552 | End: 2021-09-20
Payer: COMMERCIAL

## 2021-09-20 ENCOUNTER — APPOINTMENT (OUTPATIENT)
Dept: RADIOLOGY | Facility: HOSPITAL | Age: 56
DRG: 552 | End: 2021-09-20
Payer: COMMERCIAL

## 2021-09-20 LAB
ANION GAP SERPL CALCULATED.3IONS-SCNC: 4 MMOL/L (ref 4–13)
BASOPHILS # BLD AUTO: 0.03 THOUSANDS/ΜL (ref 0–0.1)
BASOPHILS NFR BLD AUTO: 0 % (ref 0–1)
BUN SERPL-MCNC: 14 MG/DL (ref 5–25)
CALCIUM SERPL-MCNC: 8.6 MG/DL (ref 8.3–10.1)
CHLORIDE SERPL-SCNC: 109 MMOL/L (ref 100–108)
CO2 SERPL-SCNC: 24 MMOL/L (ref 21–32)
CREAT SERPL-MCNC: 0.66 MG/DL (ref 0.6–1.3)
EOSINOPHIL # BLD AUTO: 0.2 THOUSAND/ΜL (ref 0–0.61)
EOSINOPHIL NFR BLD AUTO: 2 % (ref 0–6)
ERYTHROCYTE [DISTWIDTH] IN BLOOD BY AUTOMATED COUNT: 13.4 % (ref 11.6–15.1)
GFR SERPL CREATININE-BSD FRML MDRD: 108 ML/MIN/1.73SQ M
GLUCOSE P FAST SERPL-MCNC: 82 MG/DL (ref 65–99)
GLUCOSE SERPL-MCNC: 82 MG/DL (ref 65–140)
HCT VFR BLD AUTO: 44.3 % (ref 36.5–49.3)
HGB BLD-MCNC: 15 G/DL (ref 12–17)
IMM GRANULOCYTES # BLD AUTO: 0.06 THOUSAND/UL (ref 0–0.2)
IMM GRANULOCYTES NFR BLD AUTO: 1 % (ref 0–2)
LYMPHOCYTES # BLD AUTO: 1.28 THOUSANDS/ΜL (ref 0.6–4.47)
LYMPHOCYTES NFR BLD AUTO: 13 % (ref 14–44)
MAGNESIUM SERPL-MCNC: 2.4 MG/DL (ref 1.6–2.6)
MCH RBC QN AUTO: 31.4 PG (ref 26.8–34.3)
MCHC RBC AUTO-ENTMCNC: 33.9 G/DL (ref 31.4–37.4)
MCV RBC AUTO: 93 FL (ref 82–98)
MONOCYTES # BLD AUTO: 0.61 THOUSAND/ΜL (ref 0.17–1.22)
MONOCYTES NFR BLD AUTO: 6 % (ref 4–12)
NEUTROPHILS # BLD AUTO: 7.75 THOUSANDS/ΜL (ref 1.85–7.62)
NEUTS SEG NFR BLD AUTO: 78 % (ref 43–75)
NRBC BLD AUTO-RTO: 0 /100 WBCS
PHOSPHATE SERPL-MCNC: 2.9 MG/DL (ref 2.7–4.5)
PLATELET # BLD AUTO: 291 THOUSANDS/UL (ref 149–390)
PMV BLD AUTO: 9.3 FL (ref 8.9–12.7)
POTASSIUM SERPL-SCNC: 3.6 MMOL/L (ref 3.5–5.3)
RBC # BLD AUTO: 4.78 MILLION/UL (ref 3.88–5.62)
SODIUM SERPL-SCNC: 137 MMOL/L (ref 136–145)
WBC # BLD AUTO: 9.93 THOUSAND/UL (ref 4.31–10.16)

## 2021-09-20 PROCEDURE — 72141 MRI NECK SPINE W/O DYE: CPT

## 2021-09-20 PROCEDURE — 80048 BASIC METABOLIC PNL TOTAL CA: CPT

## 2021-09-20 PROCEDURE — 99254 IP/OBS CNSLTJ NEW/EST MOD 60: CPT | Performed by: NEUROLOGICAL SURGERY

## 2021-09-20 PROCEDURE — 72080 X-RAY EXAM THORACOLMB 2/> VW: CPT

## 2021-09-20 PROCEDURE — 84100 ASSAY OF PHOSPHORUS: CPT

## 2021-09-20 PROCEDURE — 85025 COMPLETE CBC W/AUTO DIFF WBC: CPT

## 2021-09-20 PROCEDURE — 99233 SBSQ HOSP IP/OBS HIGH 50: CPT | Performed by: SURGERY

## 2021-09-20 PROCEDURE — 97166 OT EVAL MOD COMPLEX 45 MIN: CPT

## 2021-09-20 PROCEDURE — 83735 ASSAY OF MAGNESIUM: CPT

## 2021-09-20 PROCEDURE — 97163 PT EVAL HIGH COMPLEX 45 MIN: CPT

## 2021-09-20 RX ORDER — DIAZEPAM 5 MG/1
10 TABLET ORAL ONCE AS NEEDED
Status: DISCONTINUED | OUTPATIENT
Start: 2021-09-20 | End: 2021-09-21

## 2021-09-20 RX ORDER — POTASSIUM CHLORIDE 20 MEQ/1
40 TABLET, EXTENDED RELEASE ORAL ONCE
Status: COMPLETED | OUTPATIENT
Start: 2021-09-20 | End: 2021-09-20

## 2021-09-20 RX ADMIN — METHOCARBAMOL 500 MG: 500 TABLET, FILM COATED ORAL at 04:58

## 2021-09-20 RX ADMIN — HYDROMORPHONE HYDROCHLORIDE 0.5 MG: 1 INJECTION, SOLUTION INTRAMUSCULAR; INTRAVENOUS; SUBCUTANEOUS at 11:04

## 2021-09-20 RX ADMIN — ENOXAPARIN SODIUM 30 MG: 30 INJECTION SUBCUTANEOUS at 08:19

## 2021-09-20 RX ADMIN — OXYCODONE HYDROCHLORIDE 10 MG: 10 TABLET ORAL at 10:31

## 2021-09-20 RX ADMIN — METHOCARBAMOL 500 MG: 500 TABLET, FILM COATED ORAL at 17:59

## 2021-09-20 RX ADMIN — ACETAMINOPHEN 975 MG: 325 TABLET, FILM COATED ORAL at 04:58

## 2021-09-20 RX ADMIN — OXYCODONE HYDROCHLORIDE 10 MG: 10 TABLET ORAL at 21:17

## 2021-09-20 RX ADMIN — ACETAMINOPHEN 975 MG: 325 TABLET, FILM COATED ORAL at 21:17

## 2021-09-20 RX ADMIN — ENOXAPARIN SODIUM 30 MG: 30 INJECTION SUBCUTANEOUS at 21:17

## 2021-09-20 RX ADMIN — METHOCARBAMOL 500 MG: 500 TABLET, FILM COATED ORAL at 23:51

## 2021-09-20 RX ADMIN — HYDROMORPHONE HYDROCHLORIDE 0.5 MG: 1 INJECTION, SOLUTION INTRAMUSCULAR; INTRAVENOUS; SUBCUTANEOUS at 12:04

## 2021-09-20 RX ADMIN — METHOCARBAMOL 500 MG: 500 TABLET, FILM COATED ORAL at 12:04

## 2021-09-20 RX ADMIN — OXYCODONE HYDROCHLORIDE 10 MG: 10 TABLET ORAL at 04:57

## 2021-09-20 RX ADMIN — POTASSIUM CHLORIDE 40 MEQ: 1500 TABLET, EXTENDED RELEASE ORAL at 08:19

## 2021-09-20 RX ADMIN — OXYCODONE HYDROCHLORIDE 10 MG: 10 TABLET ORAL at 15:38

## 2021-09-20 NOTE — ASSESSMENT & PLAN NOTE
T12 SEP compression fracture noted on imaging    Imaging:    CT chest abdomen pelvis w 09/19/2021:No acute visceral organ or vascular injury of the chest, abdomen, or pelvis  Multiple acute fractures:Acute anterior superior corner fracture of C6 vertebral body  Acute nondisplaced fracture of left C6 articular pillar  Acute T12 superior endplate compression fracture with mild height loss  Small subcutaneous hematoma in left anterolateral chest region      Thoracolumbar x-ray 09/20/2021:Stable mild superior endplate fracture at Y36 better seen on CT    Plan:  · TLSO brace ordered to be worn when OOB or HOB >45 degrees  · See above plan

## 2021-09-20 NOTE — ASSESSMENT & PLAN NOTE
Missing mandibular left central incisor tooth with tiny locules of gas in the tooth socket s/p fall  · OMFS consulted  · No OMFS intervention warranted  · Maintain oral hygiene as inpatient  · Follow up at outpatient [facility] clinic -- patient can call [phone] to schedule an appointment for 5 days after discharge for comprehensive exam and radiographs

## 2021-09-20 NOTE — PROGRESS NOTES
In an attempt to cluster care of off-unit procedures, patient and his family informed this RN that Dr Alec Gerardo informed them that the MRI was unnecessary and that patient would not be obtaining the MRI procedure this evening scheduled for 2030  Night shift RN notified, treatment team to be notified upon return to clinical floor  Patient proceeded with upright X-rays of C and T Spine

## 2021-09-20 NOTE — PLAN OF CARE
Problem: PAIN - ADULT  Goal: Verbalizes/displays adequate comfort level or baseline comfort level  Description: Interventions:  - Encourage patient to monitor pain and request assistance  - Assess pain using appropriate pain scale  - Administer analgesics based on type and severity of pain and evaluate response  - Implement non-pharmacological measures as appropriate and evaluate response  - Consider cultural and social influences on pain and pain management  - Notify physician/advanced practitioner if interventions unsuccessful or patient reports new pain  Outcome: Progressing     Problem: SAFETY ADULT  Goal: Patient will remain free of falls  Description: INTERVENTIONS:  - Educate patient/family on patient safety including physical limitations  - Instruct patient to call for assistance with activity   - Consult OT/PT to assist with strengthening/mobility   - Keep Call bell within reach  - Keep bed low and locked with side rails adjusted as appropriate  - Keep care items and personal belongings within reach  - Initiate and maintain comfort rounds  - Make Fall Risk Sign visible to staff  - Offer Toileting every  Hours, in advance of need  - Initiate/Maintain alarm  - Obtain necessary fall risk management equipment:   - Apply yellow socks and bracelet for high fall risk patients  - Consider moving patient to room near nurses station  Outcome: Progressing     Problem: Prexisting or High Potential for Compromised Skin Integrity  Goal: Skin integrity is maintained or improved  Description: INTERVENTIONS:  - Identify patients at risk for skin breakdown  - Assess and monitor skin integrity  - Assess and monitor nutrition and hydration status  - Monitor labs   - Assess for incontinence   - Turn and reposition patient  - Assist with mobility/ambulation  - Relieve pressure over bony prominences  - Avoid friction and shearing  - Provide appropriate hygiene as needed including keeping skin clean and dry  - Evaluate need for skin moisturizer/barrier cream  - Collaborate with interdisciplinary team   - Patient/family teaching  - Consider wound care consult   Outcome: Progressing     Problem: MOBILITY - ADULT  Goal: Maintain or return to baseline ADL function  Description: INTERVENTIONS:  -  Assess patient's ability to carry out ADLs; assess patient's baseline for ADL function and identify physical deficits which impact ability to perform ADLs (bathing, care of mouth/teeth, toileting, grooming, dressing, etc )  - Assess/evaluate cause of self-care deficits   - Assess range of motion  - Assess patient's mobility; develop plan if impaired  - Assess patient's need for assistive devices and provide as appropriate  - Encourage maximum independence but intervene and supervise when necessary  - Involve family in performance of ADLs  - Assess for home care needs following discharge   - Consider OT consult to assist with ADL evaluation and planning for discharge  - Provide patient education as appropriate  Outcome: Progressing  Goal: Maintains/Returns to pre admission functional level  Description: INTERVENTIONS:  - Perform BMAT or MOVE assessment daily    - Set and communicate daily mobility goal to care team and patient/family/caregiver  - Collaborate with rehabilitation services on mobility goals if consulted  - Perform Range of Motion  times a day  - Reposition patient every  hours    - Dangle patient  times a day  - Stand patient times a day  - Ambulate patient  times a day  - Out of bed to chair  times a day   - Out of bed for meals 3 times a day  - Out of bed for toileting  - Record patient progress and toleration of activity level   Outcome: Progressing     Problem: NEUROSENSORY - ADULT  Goal: Achieves stable or improved neurological status  Description: INTERVENTIONS  - Monitor and report changes in neurological status  - Monitor vital signs such as temperature, blood pressure, glucose, and any other labs ordered   - Initiate measures to prevent increased intracranial pressure  - Monitor for seizure activity and implement precautions if appropriate      Outcome: Progressing  Goal: Remains free of injury related to seizures activity  Description: INTERVENTIONS  - Maintain airway, patient safety  and administer oxygen as ordered  - Monitor patient for seizure activity, document and report duration and description of seizure to physician/advanced practitioner  - If seizure occurs,  ensure patient safety during seizure  - Reorient patient post seizure  - Seizure pads on all 4 side rails  - Instruct patient/family to notify RN of any seizure activity including if an aura is experienced  - Instruct patient/family to call for assistance with activity based on nursing assessment  - Administer anti-seizure medications if ordered    Outcome: Progressing  Goal: Achieves maximal functionality and self care  Description: INTERVENTIONS  - Monitor swallowing and airway patency with patient fatigue and changes in neurological status  - Encourage and assist patient to increase activity and self care     - Encourage visually impaired, hearing impaired and aphasic patients to use assistive/communication devices  Outcome: Progressing

## 2021-09-20 NOTE — OCCUPATIONAL THERAPY NOTE
Occupational Therapy Evaluation     Patient Name: Meghann Webber  AXBLI'Z Date: 9/20/2021  Problem List  Principal Problem:    C6 cervical fracture [facility])  Active Problems:    Compression fracture of body of thoracic vertebra (HCC)    Loss of single tooth    Subcutaneous hematoma    Past Medical History  History reviewed  No pertinent past medical history  Past Surgical History  Past Surgical History:   Procedure Laterality Date    ACHILLES TENDON SURGERY      LAMINECTOMY             09/20/21 1111   OT Last Visit   OT Visit Date 09/20/21   Note Type   Note type Evaluation   Restrictions/Precautions   Weight Bearing Precautions Per Order No   Braces or Orthoses TLSO;C/S Collar   Other Precautions Fall Risk;Pain;Spinal precautions   Pain Assessment   Pain Assessment Tool 0-10   Pain Score 7   Pain Location/Orientation Orientation: Left; Location: Arm;Location: Shoulder   Patient's Stated Pain Goal No pain   Hospital Pain Intervention(s) Repositioned; Ambulation/increased activity; Emotional support   Home Living   Type of [facility] Multi-level;Stairs to enter with rails  (5 CONCEPCION ; BILEVEL)   Additional Comments NO USE OF DME AT BASELINE    Prior Function   Level of [place] Independent with ADLs and functional mobility   Lives With Alone   Receives Help From Family   ADL Assistance Independent   IADLs Independent   Falls in the last 6 months 1 to 4  (5- [address] )   Vocational Full time employment   Lifestyle   Autonomy PT REPORTS BEING I WITH ADLS/IADLS/DRIVING PTA    Reciprocal Relationships LIVES ALONE  SUPPORTIVE DAUGHTER LIVES RIGHT DOWN THE ROAD AND GF ALSO ABLE TO CHECK IN MULTIPLE TIMES PER DAY   PT ALSO CAN STAY WITH GF AS NEEDED    Service to Others WORKS FULL TIME; [employer]   Intrinsic Gratification ENJOYS GOLFING AND HUNTING    Psychosocial   Psychosocial (WDL) WDL   Subjective   Subjective TO WORK WITH THERAPY IN ORDER TO RETURN TO PLAY GOLF    ADL   Eating Assistance 5 Supervision/Setup   Grooming Assistance 5  Supervision/Setup   UB Bathing Assistance 5  Supervision/Setup   LB Bathing Assistance 4  Minimal Assistance   UB Dressing Assistance 4  Minimal Assistance   LB Dressing Assistance 4  Minimal Assistance   Toileting Assistance  5  Supervision/Setup   Functional Assistance 5  Supervision/Setup   Bed Mobility   Rolling L 5  Supervision   Additional items Increased time required;Verbal cues   Supine to Sit 5  Supervision   Additional items Increased time required;Verbal cues   Sit to Supine Unable to assess  (PT LEFT OOB WITH ALL NEEDS IN REACH )   Transfers   Sit to Stand 5  Supervision   Additional items Increased time required   Stand to Sit 5  Supervision   Additional items Increased time required   Functional Mobility   Functional Mobility 5  Supervision   Balance   Static Sitting Good   Static Standing Fair   Ambulatory Fair -   Activity Tolerance   Activity Tolerance Patient tolerated treatment well   Medical Staff Made Aware    Nurse Made Aware APPROPRIATE TO SEE    RUE Assessment   RUE Assessment WFL   LUE Assessment   LUE Assessment X  (LIMITED 2' PAIN; PLAN FOR MRI TO R/O BRACHIAL PLEXUS INJURY)   Hand Function   Gross Motor Coordination Functional   Fine Motor Coordination Functional   Sensation   Light Touch Partial deficits in the LUE   Cognition   Overall Cognitive Status [facility]   Arousal/Participation Alert; Cooperative   Attention Within functional limits   Orientation Level Oriented X4   Memory Within functional limits   Following Commands Follows all commands and directions without difficulty   Comments PT IS PLEASANT AND COOPERATIVE    Assessment   Assessment 63 YO Male SEEN FOR INITIAL OCCUPATIONAL THERAPY EVALUATION FOLLOWING TXF [facility] RES[facility]ING IN C6 FX, T12 SUPERIOR ENDPLATE FX, AND CHEST WALL HEMATOMA  PT CURRENTLY HAS THE FOLLOWING RESTRICTIONS;SPINAL PRECAUTIONS, C-COLLAR and TLSO    PT IS FROM HOME ALONE WHERE HE REPORTS BEING INDEPENDENT WITH ADLS/IADLS/DRIVING PTA  PT CURRENTLY REQUIRES OVERALL SUPERVISION-MIN A WITH ADLS, AND SUPERVISION WITH TRANSFERS /FUNCTIONAL MOBILITY WITHOUT USE OF AD  PT IS EXPERIENCING EXPECTED LIMITATIONS 2' PAIN, FATIGUE, IMPAIRED BALANCE, SPINAL PRECAUTIONS, LUE PAIN/SENSORY DEFICITS and OVERALL LIMITED ACTIVITY TOLERANCE  PT EDUCATED ON SPINAL PRECAUTIONS, LOG ROLL TECHNIQUE, COLLAR/TLSO MANAGEMENT, DEEP BREATHING TECHNIQUES T/O ACTIVITY, SLOWING OF PACE, ENERGY CONSERVATION TECHNIQUES FOR CARRY OVER UPON D/C, INCREASED FAMILY SUPPORT and CONTINUE PARTICIPATION IN SELF-CARE/MOBILITY WITH STAFF [address]   The patient's raw score on the AM-PAC Daily Activity inpatient short form is 20, standardized score is 42 03, greater than 39 4  Patients at this level are likely to benefit from discharge to home  Please refer to the recommendation of the Occupational Therapist for safe discharge planning  FROM AN OCCUPATIONAL THERAPY PERSPECTIVE, PT CAN RETURN HOME WITH INCREASED FAMILY SUPPORT WHEN MEDICALLY CLEARED  RECOMMEND F/U WITH OUTPT THERAPY FOR LUE IF SYMPTOMS DO NOT RESOLVE  DME RECS INCLUDE SC, PT AGREEABLE  ALL QUESTIONS/CONCERNS ADDRESSED  NO ADDITIONAL ACUTE CARE OT NEEDS  D/C OT      Goals   Patient Goals TO RETURN HOME    Recommendation   OT Discharge Recommendation No rehabilitation needs  (INCREASED FAMILY SUPPORT; F/U WITH OUTPT THERAPY FOR LUE(?))   Equipment Recommended Shower/Tub chair with back ($)   OT - OK to Discharge Yes   AM-PAC Daily Activity Inpatient   Lower Body Dressing 3   Bathing 3   Toileting 3   Upper Body Dressing 3   Grooming 4   Eating 4   Daily Activity Raw Score 20   Daily Activity Standardized Score (Calc for Raw Score >=11) 42 03   AM-PAC Applied Cognition Inpatient   Following a Speech/Presentation 4   Understanding Ordinary Conversation 4   Taking Medications 4   Remembering Where Things Are Placed or Put Away 4   Remembering List of 4-5 Errands 4   Taking Care of Complicated Tasks 4   Applied Cognition Raw Score 24   Applied Cognition Standardized Score 62 21   Modified [place] Scale   Modified [place] Scale 3       Documentation completed by LISSA Mcneil, OTR/L  [address] [place] [address] Certified ID# [id number]

## 2021-09-20 NOTE — PLAN OF CARE
Problem: PAIN - ADULT  Goal: Verbalizes/displays adequate comfort level or baseline comfort level  Description: Interventions:  - Encourage patient to monitor pain and request assistance  - Assess pain using appropriate pain scale  - Administer analgesics based on type and severity of pain and evaluate response  - Implement non-pharmacological measures as appropriate and evaluate response  - Consider cultural and social influences on pain and pain management  - Notify physician/advanced practitioner if interventions unsuccessful or patient reports new pain  Outcome: Progressing     Problem: SAFETY ADULT  Goal: Patient will remain free of falls  Description: INTERVENTIONS:  - Educate patient/family on patient safety including physical limitations  - Instruct patient to call for assistance with activity   - Consult OT/PT to assist with strengthening/mobility   - Keep Call bell within reach  - Keep bed low and locked with side rails adjusted as appropriate  - Keep care items and personal belongings within reach  - Initiate and maintain comfort rounds  - Make Fall Risk Sign visible to staff  - Offer Toileting every 2 Hours, in advance of need  - Initiate/Maintain bed alarm  - Apply yellow socks and bracelet for high fall risk patients  - Consider moving patient to room near nurses station  Outcome: Progressing     Problem: Knowledge Deficit  Goal: Patient/family/caregiver demonstrates understanding of disease process, treatment plan, medications, and discharge instructions  Description: Complete learning assessment and assess knowledge base    Interventions:  - Provide teaching at level of understanding  - Provide teaching via preferred learning methods  Outcome: Progressing

## 2021-09-20 NOTE — PROGRESS NOTES
[facility]  Progress Note Ondina Mares 1965, 64 y o  male MRN: [id number]  Unit/Bed#: [facility] 602-01 Encounter: [id number]  Primary Care Provider: No primary care provider on file  Date and time admitted to hospital: 9/19/2021  4:26 PM    Subcutaneous hematoma  Assessment & Plan  Small subcutaneous hematoma in left anterolateral chest region  · Continue to monitor  · AM CBC - Hgb stable at 15    Loss of single tooth  Assessment & Plan  Missing mandibular left central incisor tooth with tiny locules of gas in the tooth socket s/p fall  · OMFS consulted  · No OMFS intervention warranted  · Maintain oral hygiene as inpatient  · Follow up at outpatient [facility] clinic -- patient can call [phone] to schedule an appointment for 5 days after discharge for comprehensive exam and radiographs    Compression fracture of body of thoracic vertebra [facility])  Assessment & Plan  Neurosurgery consulted  · Pending evaluation   · Multimodal analgesic regimen as needed  · PT/OT    * C6 cervical fracture ([address] )  Assessment & Plan  Acute anterior superior corner fracture of the left C6 vertebral body  Acute nondisplaced fracture of left C6 articular pillar   - Neurosurgery consulted   - Pending evaluation  - Bracing: Maintain cervical collar   - Spine precautions  - Monitor neurovascular exam   - Multimodal analgesic regimen as needed    - PT and OT evaluation and treatment as indicated after neurosurg eval    TERTIARY TRAUMA SURVEY NOTE    Prophylaxis: Enoxaparin (Lovenox)    Disposition: Continue care, pending neurosurgery evaluation    Code status:  Level 1 - Full Code    Consultants: Neurosurgery, OMFS    Is the patient 65 years or older?: No      SUBJECTIVE:     Transfer from: [facility]  Outside Films Received: yes  Tertiary Exam Due on: 9/20/21    Mechanism of Injury:Fall    Details related to Injury: +LOC:  no    Chief Complaint: Neck/ back pain    HPI/Last 24 hour events: Patient seen and examined at bedside, no acute events overnight  Patient pending neurosurgery evaluation  OMFS saw patient - no surgical intervention and patient to follow up outpatient  If patient continues to have persistent paresthesias in LUE or develops motor loss, will order stat MRI  Replete potassium (3 6)    Active medications:           Current Facility-Administered Medications:     acetaminophen (TYLENOL) tablet 975 mg, 975 mg, Oral, Q8H MARCUS, 975 mg at 09/20/21 0458    enoxaparin (LOVENOX) subcutaneous injection 30 mg, 30 mg, Subcutaneous, Q12H MARCUS, 30 mg at 09/19/21 2101    HYDROmorphone (DILAUDID) injection 0 5 mg, 0 5 mg, Intravenous, Q1H PRN    methocarbamol (ROBAXIN) tablet 500 mg, 500 mg, Oral, Q6H MARCUS, 500 mg at 09/20/21 0458    nicotine (NICODERM CQ) 21 mg/24 hr TD 24 hr patch 1 patch, 1 patch, Transdermal, Daily    ondansetron (ZOFRAN) injection 4 mg, 4 mg, Intravenous, Q6H PRN, 4 mg at 09/19/21 1737    oxyCODONE (ROXICODONE) immediate release tablet 10 mg, 10 mg, Oral, Q4H PRN, 10 mg at 09/20/21 0457    oxyCODONE (ROXICODONE) IR tablet 5 mg, 5 mg, Oral, Q4H PRN      OBJECTIVE:     Vitals:   Vitals:    09/20/21 0702   BP: 117/75   Pulse: 77   Resp: 17   Temp: (!) 97 3 °F (36 3 °C)   SpO2: 94%     Physical Exam:   GENERAL APPEARANCE: No acute distress  NEURO: GCS 15, No neuro deficits  HEENT: Normocephalic, atraumatic, C spine collar in place  CV: Normal rate and rhythm  LUNGS: Clear to auscultation bilaterally, no increased work of breathing  GI: Soft, non tender, non distended  MSK: Normal ROM in all extremities  SKIN: Warm, dry, well perfused    I/O:   I/O       09/18 0701 - 09/19 0700 09/19 0701 - 09/20 0700 09/20 0701 - 09/21 0700    Urine (mL/kg/hr)  550     Total Output  550     Net  -550                  Invasive Devices:    Invasive Devices     Peripheral Intravenous Line            Peripheral IV 09/19/21 Distal;Left;Upper;Ventral (anterior) Arm <1 day    Peripheral IV 09/19/21 Right Arm <1 day              Imaging:   CT head without contrast    Result Date: 9/19/2021  Impression: No acute intracranial abnormality  Workstation performed: [id number]     CT facial bones without contrast    Result Date: 9/19/2021  Impression: Missing mandibular left central incisor tooth with tiny locules of gas in the tooth socket, likely from recent trauma  No acute maxillofacial fracture  Old depressed right medial orbital wall fracture with orbital fat herniation  Workstation performed: [id number]     CTA neck w wo contrast    Result Date: 9/19/2021  Impression: 1  No evidence of pseudoaneurysm, dissection or occlusion of the carotid or vertebral arteries to suggest acute vascular trauma  2   Incidental severe left subclavian artery stenosis due to impingement by the scalene muscles  Thoracic outlet syndrome is possible in the appropriate clinical context  3   No evidence of acute cervical spine fracture  Workstation performed: [id number]     CT spine cervical without contrast    Addendum Date: 9/19/2021    ADDENDUM: I personally discussed this study with Cayla Esquivel on 9/19/2021 at 2:44 PM      Addendum Date: 9/19/2021    ADDENDUM: Upon further review, -Acute anterior superior corner fracture of the left C6 vertebral body (602:86)  -Acute nondisplaced fracture of left C6 articular pillar (602:69)  Result Date: 9/19/2021  Impression: - No cervical spine fracture or traumatic malalignment  - Probable focal enthesitis posterior to C6 spinous process  Given tiny locule of gas in this region, infection or tiny radiolucent foreign body should be considered  The study was marked in [place] [id number] for immediate notification  Workstation performed: [id number]     CT chest abdomen pelvis w contrast    Result Date: 9/19/2021  Impression: No acute visceral organ or vascular injury of the chest, abdomen, or pelvis  Multiple acute fractures: - Acute anterior superior corner fracture of C6 vertebral body    - Acute nondisplaced fracture of left C6 articular pillar  - Acute T12 superior endplate compression fracture with mild height loss  Small subcutaneous hematoma in left anterolateral chest region  I personally discussed this study with Philip Sánchez on 9/19/2021 at 2:44 PM  Workstation performed: [id number]     CT recon only lumbar spine    Addendum Date: 9/19/2021    ADDENDUM: I personally discussed this study with Philip Sánchez on 9/19/2021 at 2:44 PM      Result Date: 9/19/2021  Impression: Acute T12 superior endplate compression fracture with mild vertebral body height loss  No acute osseous abnormality of lumbar spine  Multilevel degenerative changes of lumbar spine, severe at L4-L5  Please see dedicated CT chest abdomen pelvis with contrast for further evaluation  Workstation performed: [id number]     CT recon only thoracolumbar (No Charge)    Addendum Date: 9/19/2021    ADDENDUM: I personally discussed this study with Philip Sánchez on 9/19/2021 at 2:44 PM      Result Date: 9/19/2021  Impression: Acute T12 superior endplate compression fracture with mild vertebral body height loss   Workstation performed: [id number]       Labs:   CBC:   Lab Results   Component Value Date    WBC 9 93 09/20/2021    HGB 15 0 09/20/2021    HCT 44 3 09/20/2021    MCV 93 09/20/2021     09/20/2021    MCH 31 4 09/20/2021    MCHC 33 9 09/20/2021    RDW 13 4 09/20/2021    MPV 9 3 09/20/2021    NRBC 0 09/20/2021     CMP:   Lab Results   Component Value Date     (H) 09/20/2021    CO2 24 09/20/2021    BUN 14 09/20/2021    CREATININE 0 66 09/20/2021    CALCIUM 8 6 09/20/2021    EGFR 108 09/20/2021

## 2021-09-20 NOTE — CONSULTS
[address] [facility] [place] 64 [place]  [place] MRN: [id number]  Unit/Bed#: [facility] 602-01 Encounter: [id number]  Primary Care Provider: No primary care provider on file  Date and time admitted to hospital: 9/19/2021  4:26 PM    Inpatient consult to Neurosurgery  Consult performed by: Neil Wolfe PA-C  Consult ordered by: Mana Napier MD          Compression fracture of body of thoracic vertebra [facility])  Assessment & Plan  See plan above  * C6 cervical fracture [facility])  Assessment & Plan  Pleasant 70-year-old male that is status post fall from 15 ft  · No home blood thinners seen in chart  · Presents with left shoulder pain with radiation of tingling only down to his left forearm  · GCS 15; BROWN 5/5  · Concern for C6 fx and SEP compression ofT12 fx; w    Imaging:    CT cervical 9/19/21: ADDENDUM: Upon further review, Acute anterior superior corner fracture of the left C6 vertebral body (602:86)  Acute nondisplaced fracture of left C6 articular pillar  Per review with attending there is oblique C7 fx seen  CT head 9/19/21: No acute intracranial abnormality  CTCAP 9/19/21: No acute visceral organ or vascular injury of the chest, abdomen, or pelvis  Multiple acute fractures: Acute anterior superior corner fracture of C6 vertebral body  Acute nondisplaced fracture of left C6 articular pillar  Acute T12 superior endplate compression fracture with mild height loss  Small subcutaneous hematoma in left anterolateral chest region           Plan:  · TLSO brace ordered for comfort PRN  · [place] collar to be worn at all times  · [place] collar for hygiene/showerin  · Ordered upright cervical and thoracolumbar spine XR AP/lateral  · Mobilize with PT/OT with braces  · DVT PPX: SCDs and clear to initiate pharmacological DVT     · In agreement with MRI cervical w/o contrast to assess for ligamentous damage given change in neuro exam       Neurosurgery will continue to follow once MRI cervical w/o contrast is completed and baseline upright x-rays are completed  Plan of care discussed with trauma member, Yadira Rodriguez NP  History of Present Illness     This is a very pleasant 80-year-old male that presents status post fall from tree (15 ft)  The patient was reinforcing equipment for hunting season when he fell  Patient reports that he fell so fast he does not know feet landed face 1st or back 1st   He denies any loss of consciousness  He reports that he did get up after fall and realized that he lost a tooth  At this time, patient reports localized left-sided shoulder pain with tingling only down his left arm to his forearm  He denies any back pain but is tender to palpate upon examination in the mid to low region of his back  Patient denies any symptoms of pain in his bilateral extremities or bilateral upper extremities, numbness his upper lower extremities, weakness, bowel bladder incontinence, saddle anesthesia, headaches, nausea, vomiting, or dizziness  Patient's significant other is accompanied at bedside  Patient was seen ambulating with PT/OT  Review of Systems   Constitutional: Negative  HENT: Negative  Eyes: Negative  Respiratory: Negative  Cardiovascular: Negative  Gastrointestinal: Negative for nausea and vomiting  Endocrine: Negative  Genitourinary: Negative  Musculoskeletal: Positive for back pain  Negative for gait problem  Neurological: Negative for dizziness, weakness, light-headedness, numbness and headaches  Psychiatric/Behavioral: Negative  Negative for confusion  A 10 point system review was performed and otherwise negative, save what is noted above  Historical Information   History reviewed  No pertinent past medical history    Past Surgical History:   Procedure Laterality Date    ACHILLES TENDON SURGERY      LAMINECTOMY       Social History     Substance and Sexual Activity   Alcohol Use Not Currently Social History     Substance and Sexual Activity   Drug Use Never     Social History     Tobacco Use   Smoking Status Current Every Day Smoker    Packs/day: 1 00    Types: Cigarettes   Smokeless Tobacco Never Used     History reviewed  No pertinent family history  Meds/Allergies   all current active meds have been reviewed  No Known Allergies    Objective   I/O       09/18 0701 - 09/19 0700 09/19 0701 - 09/20 0700 09/20 0701 - 09/21 0700    P  O    240    Total Intake(mL/kg)   240 (2 7)    Urine (mL/kg/hr)  550 225 (0 4)    Total Output  550 225    Net  -550 +15           Unmeasured Urine Occurrence   1 x          Physical Exam  Vitals reviewed  Constitutional:       General: He is awake  He is not in acute distress  Appearance: Normal appearance  He is well-developed, well-groomed and normal weight  He is not ill-appearing  HENT:      Head: Normocephalic  Nose: Nose normal       Mouth/Throat:      Mouth: Mucous membranes are dry  Eyes:      Extraocular Movements: Extraocular movements intact  Conjunctiva/sclera: Conjunctivae normal       Pupils: Pupils are equal, round, and reactive to light  Cardiovascular:      Pulses: Normal pulses  Pulmonary:      Effort: Pulmonary effort is normal       Breath sounds: Normal breath sounds  Abdominal:      General: Abdomen is flat  Musculoskeletal:      Cervical back: Normal range of motion  Skin:     General: Skin is warm and dry  Neurological:      General: No focal deficit present  Mental Status: He is alert and oriented to person, place, and time  GCS: GCS eye subscore is 4  GCS verbal subscore is 5  GCS motor subscore is 6  Cranial Nerves: Cranial nerves are intact  Sensory: Sensation is intact  Motor: Motor function is intact  No weakness or pronator drift  Coordination: Coordination is intact  Finger-Nose-Finger Test normal       Gait: Gait is intact        Deep Tendon Reflexes: Strength normal  Reflex Scores:       Brachioradialis reflexes are 2+ on the right side and 2+ on the left side  Patellar reflexes are 2+ on the right side and 2+ on the left side  Psychiatric:         Attention and Perception: Attention and perception normal          Mood and Affect: Mood and affect normal          Speech: Speech normal          Behavior: Behavior normal  Behavior is cooperative  Thought Content: Thought content normal          Cognition and Memory: Cognition and memory normal          Judgment: Judgment normal        Neurologic Exam     Mental Status   Oriented to person, place, and time  Follows 3 step commands  Attention: normal  Concentration: normal    Speech: speech is normal   Level of consciousness: alert  Knowledge: good  Able to perform simple calculations  Normal comprehension  Cranial Nerves   Cranial nerves II through XII intact  CN III, IV, VI   Pupils are equal, round, and reactive to light  Motor Exam   Muscle bulk: normal  Overall muscle tone: normal  Right arm pronator drift: absent  Left arm pronator drift: absent    Strength   Strength 5/5 throughout  Sensory Exam   Light touch normal    Except in left upper extremity tingling noted from scapulae to left forearm  Gait, Coordination, and Reflexes     Gait  Gait: normal    Coordination   Finger to nose coordination: normal    Reflexes   Right brachioradialis: 2+  Left brachioradialis: 2+  Right patellar: 2+  Left patellar: 2+  Right Goss: absent  Left Goss: absent  Right ankle clonus: absent  Left ankle clonus: absent        Vitals:Blood pressure 120/79, pulse 84, temperature 97 5 °F (36 4 °C), resp  rate 17, height 5' 10" (1 778 m), weight 88 kg (194 lb), SpO2 94 %  ,Body mass index is 27 84 kg/m²       Lab Results:   Results from last 7 days   Lab Units 09/20/21  0441 09/19/21  1337   WBC Thousand/uL 9 93 15 90*   HEMOGLOBIN g/dL 15 0 15 1   HEMATOCRIT % 44 3 43 9   PLATELETS Thousands/uL 291 317 NEUTROS PCT % 78* 84*   MONOS PCT % 6 5     Results from last 7 days   Lab Units 09/20/21  0441 09/19/21  1337   POTASSIUM mmol/L 3 6 3 4*   CHLORIDE mmol/L 109* 106   CO2 mmol/L 24 22   BUN mg/dL 14 15   CREATININE mg/dL 0 66 1 03   CALCIUM mg/dL 8 6 9 0     Results from last 7 days   Lab Units 09/20/21  0441   MAGNESIUM mg/dL 2 4     Results from last 7 days   Lab Units 09/20/21  0441   PHOSPHORUS mg/dL 2 9         No results found for: TROPONINT  ABG:No results found for: PHART, REY7QZY, PO2ART, YVV0PUT, N2JKIAEU, BEART, SOURCE    Imaging Studies: I have personally reviewed pertinent films in PACS with the attending  XR chest 1 view portable    Result Date: 9/20/2021  Impression: No acute cardiopulmonary disease  Workstation performed: [id number]     XR shoulder 2+ views LEFT    Result Date: 9/20/2021  Impression: No acute osseous abnormality  Workstation performed: [id number]     CT head without contrast    Result Date: 9/19/2021  Impression: No acute intracranial abnormality  Workstation performed: [id number]     CT facial bones without contrast    Result Date: 9/19/2021  Impression: Missing mandibular left central incisor tooth with tiny locules of gas in the tooth socket, likely from recent trauma  No acute maxillofacial fracture  Old depressed right medial orbital wall fracture with orbital fat herniation  Workstation performed: [id number]     CTA neck w wo contrast    Result Date: 9/19/2021  Impression: 1  No evidence of pseudoaneurysm, dissection or occlusion of the carotid or vertebral arteries to suggest acute vascular trauma  2   Incidental severe left subclavian artery stenosis due to impingement by the scalene muscles  Thoracic outlet syndrome is possible in the appropriate clinical context  3   No evidence of acute cervical spine fracture   Workstation performed: [id number]     CT spine cervical without contrast    Addendum Date: 9/19/2021    ADDENDUM: I personally discussed this study with Kellen Gonzalez on 9/19/2021 at 2:44 PM      Addendum Date: 9/19/2021    ADDENDUM: Upon further review, -Acute anterior superior corner fracture of the left C6 vertebral body (602:86)  -Acute nondisplaced fracture of left C6 articular pillar (602:69)  Result Date: 9/19/2021  Impression: - No cervical spine fracture or traumatic malalignment  - Probable focal enthesitis posterior to C6 spinous process  Given tiny locule of gas in this region, infection or tiny radiolucent foreign body should be considered  The study was marked in [facility] for immediate notification  Workstation performed: [id number]     CT chest abdomen pelvis w contrast    Result Date: 9/19/2021  Impression: No acute visceral organ or vascular injury of the chest, abdomen, or pelvis  Multiple acute fractures: - Acute anterior superior corner fracture of C6 vertebral body  - Acute nondisplaced fracture of left C6 articular pillar  - Acute T12 superior endplate compression fracture with mild height loss  Small subcutaneous hematoma in left anterolateral chest region  I personally discussed this study with Kellen Gonzalez on 9/19/2021 at 2:44 PM  Workstation performed: [id number]     CT recon only lumbar spine    Addendum Date: 9/19/2021    ADDENDUM: I personally discussed this study with Kellen Gonzalez on 9/19/2021 at 2:44 PM      Result Date: 9/19/2021  Impression: Acute T12 superior endplate compression fracture with mild vertebral body height loss  No acute osseous abnormality of lumbar spine  Multilevel degenerative changes of lumbar spine, severe at L4-L5  Please see dedicated CT chest abdomen pelvis with contrast for further evaluation   Workstation performed: [id number]     CT recon only thoracolumbar (No Charge)    Addendum Date: 9/19/2021    ADDENDUM: I personally discussed this study with Kellen Gonzalez on 9/19/2021 at 2:44 PM      Result Date: 9/19/2021  Impression: Acute T12 superior endplate compression fracture with mild vertebral body height loss  Workstation performed: [id number]         VTE Prophylaxis: Sequential compression device (Venodyne)     Code Status: Level 1 - Full Code  Advance Directive and Living Will:      Power of :    POLST:      Counseling / Coordination of Care  I spent 20 minutes with the patient

## 2021-09-20 NOTE — CONSULTS
Oral and Maxillofacial Surgery Consult    Pt seen 09/19/21 8:42 PM    Assessment  64 y o  male who presents to ED s/p mechanical fall from 15 feet while scaling a tree during archery hunting  On exam, patient has no clinical signs of a facial fracture but has a missing lower left lateral incisor that was self extracted by the patient immediately after his fall  CT facial bones shows no facial bone fracture and lower left central incisor extraction socket without radiographic signs of bony remodeling  Plan:  - No OMFS intervention warranted  - Maintain oral hygiene as inpatient  - Maintain C-spine until cleared  - Neuro checks per trauma/neuro  - Follow up at outpatient [facility] clinic -- patient can call [phone] to schedule an appointment for 5 days after discharge for comprehensive exam and radiographs    D/w OMFS attg on call, Dr Jayne Joy     HPI: 64 y o  male [facility] [place] transfer after suffering mechanical fall from 15 feet while scaling a tree during recreational archery  Pain 3/10  Pt reports (-)LOC, (-)headache, (+)change in occlusion/open bite, (+)tooth pain/trauma, (-)rhinorrhea, (-)otorrhea, (-)vision changes, (-)blurry/double vision  Pt denies fever, chills, nausea, shortness of breath, neck pain  Last PO intake earlier today  He states his lower left lateral incisor was grossly mobile after his fall so he self extracted the tooth and "threw it on the ground"  He states his bite feels a "little off", asking if his teeth could be "pushed back in"  PMH:   History reviewed  No pertinent past medical history       Allergies:   No Known Allergies    Meds:     Current Facility-Administered Medications:     acetaminophen (TYLENOL) tablet 975 mg, 975 mg, Oral, Q8H [address], Roberto Ford MD, 975 mg at 09/19/21 1950    enoxaparin (LOVENOX) subcutaneous injection 30 mg, 30 mg, Subcutaneous, Q12H [address] Roberto Owusu MD    HYDROmorphone (DILAUDID) injection 0 5 mg, 0 5 mg, Intravenous, Q1H PRN, Robet Gowers, MD    methocarbamol (ROBAXIN) tablet 500 mg, 500 mg, Oral, Q6H [address], Robet Gowers, MD    nicotine (NICODERM CQ) 21 mg/24 hr TD 24 hr patch 1 patch, 1 patch, Transdermal, Daily, Robet Gowers, MD    ondansetron (ZOFRAN) injection 4 mg, 4 mg, Intravenous, Q6H PRN, Robet Gowers, MD, 4 mg at 09/19/21 1737    oxyCODONE (ROXICODONE) immediate release tablet 10 mg, 10 mg, Oral, Q4H PRN, Robet Gowers, MD, 10 mg at 09/19/21 1950    oxyCODONE (ROXICODONE) IR tablet 5 mg, 5 mg, Oral, Q4H PRN, Robet Gowers, MD    tetanus-diphtheria-acellular pertussis (BOOSTRIX) IM injection 0 5 mL, 0 5 mL, Intramuscular, Once, Robet Gowers, MD    PSH:   Past Surgical History:   Procedure Laterality Date    ACHILLES TENDON SURGERY      LAMINECTOMY        History reviewed  No pertinent family history  Review of Systems   Constitutional: Negative for chills and fever  HENT: Positive for dental problem  Negative for drooling, facial swelling and nosebleeds  Confirmed avulsion of tooth   Eyes: Negative for photophobia and visual disturbance  Respiratory: Negative for apnea and shortness of breath  Cardiovascular: Negative for chest pain  Psychiatric/Behavioral: Positive for decreased concentration  All other systems reviewed and are negative  Temp:  [97 4 °F (36 3 °C)-97 6 °F (36 4 °C)] 97 4 °F (36 3 °C)  HR:  [70-89] 76  Resp:  [16-21] 18  BP: (136-190)/() 136/94  SpO2:  [97 %-100 %] 97 %     No intake or output data in the 24 hours ending 09/19/21 2042     Physical Exam:  Gen: AAOx3  Somewhat somnolent  [place] collar in place  CVS: RRR  Normal S1 S2  Resp: CTA B/L, unlabored on RA  Neuro: bilateral CN V2-V3 intact  bilateral CN VII grossly intact  HEENT:   Head: No facial swelling/ecchymosis consistent with injury  *No bony step-off palpated  No tenderness to palpation  No facial laceration/abrasion  Eye: EOM intact  PEERL   No exophthalmos, enophthalmos, chemosis  Visual acuity grossly intact  Nose:  Grossly WNL  Intraoral: JENNIFER ~20mm limited by [employer]  Occlusion stable  No segmental mobility  Missing lower central incisor no ecchymosis in vestibule/FOM  Hemostatic, no lacerations  FOM soft, non-elevated, non-tender  Uvula midline  Lab Results:   CBC:   Lab Results   Component Value Date    WBC 15 90 (H) 09/19/2021    HGB 15 1 09/19/2021    HCT 43 9 09/19/2021    MCV 93 09/19/2021     09/19/2021    MCH 31 9 09/19/2021    MCHC 34 4 09/19/2021    RDW 13 2 09/19/2021    MPV 9 3 09/19/2021    NRBC 0 09/19/2021   , CMP:   Lab Results   Component Value Date    SODIUM 142 09/19/2021    K 3 4 (L) 09/19/2021     09/19/2021    CO2 22 09/19/2021    BUN 15 09/19/2021    CREATININE 1 03 09/19/2021    CALCIUM 9 0 09/19/2021    EGFR 81 09/19/2021   , Coagulation: No results found for: PT, INR, APTT  Imaging: I have personally reviewed pertinent reports      EKG, Pathology, and Other Studies:

## 2021-09-20 NOTE — ASSESSMENT & PLAN NOTE
Anterior superior corner fracture of left C6 vertebral body and nondisplaced fracture of left C6 articular pillar  · Status post fall from 15 ft  · No home blood thinners seen in chart  · Patient also sustained SEP T12 compression fracture    Imaging:    CT cervical 9/19/21: ADDENDUM: Upon further review, Acute anterior superior corner fracture of the left C6 vertebral body (602:86)  Acute nondisplaced fracture of left C6 articular pillar  Per review with attending there is oblique C7 fx seen  Cervical spine x-ray 09/19/2021:The patient's known nondisplaced C6 fractures are not visible  CTA neck w/wo 09/19/2021:No evidence of pseudoaneurysm, dissection or occlusion of the carotid or vertebral arteries to suggest acute vascular trauma  Incidental severe left subclavian artery stenosis due to impingement by the scalene muscles  Thoracic outlet syndrome is possible in the appropriate clinical context  No evidence of acute cervical spine fracture  MRI cervical spine wo 09/20/2021:Limited on diagnostic sagittal T1 and T2 imaging only obtained demonstrates no evidence of spinal cord compression  There is spinal stenosis at C5-6 and C6-7  Plan:  · Continue neurological checks  · Reviewed imaging with patient and wife  [place] collar to be worn at all times, okay for [place] collar for showering  Medical management and pain control per primary team  No neurosurgical intervention warranted at this time  No need for repeat MRI cervical spine at this time  Mobilize with PT/OT with brace and collar  Recommend outpatient orthopedic referral for left shoulder pain  DVT PPX: SCDs and SQ lovenox    Neurosurgery will sign off, patient will follow-up in 2 weeks with repeat cervical and thoracolumbar spine x-rays, call with any further questions or concerns  denies pain/discomfort

## 2021-09-20 NOTE — PHYSICAL THERAPY NOTE
Physical Therapy Evaluation    Patient's Name: Arcadio Macdonald    Admitting Diagnosis  Other closed nondisplaced fracture of sixth cervical vertebra, initial encounter [facility]) [S12 591A]  Closed traumatic fracture of tooth, initial encounter [S02  5XXA]    Problem List  Patient Active Problem List   Diagnosis    Compression fracture of body of thoracic vertebra (HCC)    C6 cervical fracture (HCC)    Loss of single tooth    Subcutaneous hematoma       Past Medical History  History reviewed  No pertinent past medical history  Past Surgical History  Past Surgical History:   Procedure Laterality Date    ACHILLES TENDON SURGERY      LAMINECTOMY          09/20/21 1055   PT Last Visit   PT Visit Date 09/20/21   Note Type   Note type Evaluation   Pain Assessment   Pain Assessment Tool 0-10   Pain Score 7  (0/7 at rest, 10/10 with activity)   Pain Location/Orientation Orientation: Left; Location: [facility] Pain Intervention(s) Repositioned; Ambulation/increased activity   Home Living   Type of Home House   Home Layout Multi-level  (5 steps up to main level)   Prior Function   Level of [place] Independent with ADLs and functional mobility   Lives With Alone   Receives Help From Family  (daughter lives down the street)   Falls in the last 6 months 1 to 4  (1 - fall out of tree stand leading to admission)   Vocational Full time employment   Comments Pt reports typically active and independent, no AD, works full time in concrete   Restrictions/Precautions   Wells Jatin Bearing Precautions Per Order No   Braces or Orthoses TLSO;C/S Collar  ([place] C/S collar, TLSO backpack)   Other Precautions Fall Risk;Pain;Spinal precautions   General   Family/Caregiver Present Yes   Cognition   Overall Cognitive Status WFL   Orientation Level Oriented X4   Following Commands Follows all commands and directions without difficulty   Comments Pt pleasant and cooperative throughout session, motivated to participate and return home/to work   LUE Assessment   LUE Assessment X  (MRI r/o brachial plexus injury pending, limited by pain)   RLE Assessment   RLE Assessment WNL   LLE Assessment   LLE Assessment WNL   Light Touch   RLE Light Touch Grossly intact   LLE Light Touch Grossly intact   Transfers   Sit to Stand 5  Supervision   Stand to Sit 5  Supervision   Additional Comments no AD, educated on spinal precautions, importance of positional changes for pain control, importance of time with positional changes for safety   Ambulation/Elevation   Gait pattern Decreased foot clearance; Short stride   Gait Assistance 5  Supervision   Assistive Device None   Distance 300 ft, initial lateral sway/impulsive, improved with cues for pacing, awareness of environment given limited visual feedback with C/S collar   Stair Management Assistance 5  Supervision   Stair Management Technique One rail L;Nonreciprocal  (to simulate home environment)   Number of Stairs 6  (3x2, cues for pacing, feeling prior to stepping given vision)   Balance   Static Sitting Good   Dynamic Sitting Fair   Static Standing Fair   Dynamic Standing Fair -   Ambulatory Fair -   Activity Tolerance   Activity Tolerance Patient tolerated treatment well   Nurse Made Aware RN updated   Assessment   Assessment Pt is a 64 y o  male seen for PT evaluation s/p admit to [facility] on 9/19/2021  Pt was admitted with a primary dx of: Fall out of [facility] resulting in C6 cervical fx, T12 superior endplate fx, Chest wall hematoma  Pt cleared for PT evaluation by neurosurgery with C/S collar, TLSO PRN  PT now consulted for assessment of mobility and d/c needs  Pt with Up in chair orders  Pts current comorbidities and personal factors effecting treatment include: prior laminectomy, works in active job, resides alone   Pts current clinical presentation is Unstable/ Unpredictable (high complexity) due to Ongoing medical management for primary dx, Decreased activity tolerance compared to baseline, Increased assistance needed from caregiver at current time, Spinal precautions at current time, Diagnostic imaging pending  Prior to admission, pt was independent with all mobility  Upon evaluation, pt currently is requiring supervision for transfers and supervision for ambulation 300 ft w/ no AD  Pt educated on spinal precautions, pacing of activity, safety with stair climb/household ambulation given limited visual field  Pt motivated to improve and return to active lifestyle  Encouraged short distance ambulation for exercise, progressively, as tolerated, verbalized understanding  No further acute PT needs at this time  Pt remains limited by L UE weakness/sensory impairments, MRI pending  At conclusion of PT session pt returned back in chair with phone and call bell within reach  Pt denies any further questions at this time  Recommend home with family care upon hospital D/C, family present at bedside and reports will be able to assist as needed     Goals   Patient Goals to go home   Plan   PT Frequency One time visit   Recommendation   PT Discharge Recommendation Home with outpatient rehabilitation   PT - OK to Discharge Yes   AM-PAC Basic Mobility Inpatient   Turning in Bed Without Bedrails 4   Lying on Back to Sitting on Edge of Flat Bed 3   Moving Bed to Chair 3   Standing Up From Chair 3   Walk in Room 3   Climb 3-5 Stairs 3   Basic Mobility Inpatient Raw Score 19   Basic Mobility Standardized Score 42 48       Tanja Marker, PT, DPT

## 2021-09-20 NOTE — PHYSICIAN ADVISOR
Current patient class: Observation  The patient is currently on Hospital Day: 2 at [address] [place] [address]      This patient was originally admitted to the hospital under observation class  After admission the patient was reevaluated and determined to require further hospitalization  The patient was then documented to require at least a 2nd midnight in the hospital  As such the patient was then expected to satisfy the 2 midnight benchmark and was therefore eligible for inpatient admission  After review of the relevant documentation, labs, vital signs and test results, the patient is appropriate for INPATIENT ADMISSION  Admission to the hospital as an inpatient is a complex decision making process which requires the practitioner to consider the patients presenting complaint, history and physical examination and all relevant testing  With this in mind, in this case, the patient was deemed appropriate for INPATIENT ADMISSION  After review of the documentation and testing available at the time of the admission I concur with this clinical determination of medical necessity  Rationale is as follows: The patient was transferred after a fall of 15 ft, he sustained T12 superior endplate compression fracture, acute anterior superior corner fracture of left C6 body and nondisplaced fracture of left C6 articular pillar with chest wall hematoma  He has been hospitalized overnight in the step-down unit as observation class  Neurosurgery was consulted today  He was still complaining of severe pain in the left posterior scapular tip region  Cervical MRI was incomplete secondary to pain  Please refer to neuro surgical consultation  It was suggested that he may have some partial brachial plexus traction secondary to scalene muscular fixation distally  He has been having paresthesias down the left arm  The patient is still requiring intravenous Dilaudid for breakthrough pain  He is not stable for discharge and will remain hospitalized for at least two midnights  At this point it is appropriate to change to an inpatient admission  The patients vitals on arrival were   ED Triage Vitals   Temperature Pulse Respirations Blood Pressure SpO2   09/19/21 1630 09/19/21 1630 09/19/21 1630 09/19/21 1630 09/19/21 1630   (!) 97 4 °F (36 3 °C) 86 16 148/91 100 %      Temp Source Heart Rate Source Patient Position - Orthostatic VS BP Location FiO2 (%)   09/19/21 1630 09/19/21 1630 -- -- --   Tympanic Monitor         Pain Score       09/19/21 1950       9           History reviewed  No pertinent past medical history  Past Surgical History:   Procedure Laterality Date    ACHILLES TENDON SURGERY      LAMINECTOMY         The patient was admitted to the hospital at N/A on N/A for the following diagnosis:  Other closed nondisplaced [facility] sixth [facility] [S12 591A]  Closed traumatic fracture of tooth, initial encounter [S02  5XXA]    Consults have been placed to:   IP CONSULT TO NEUROSURGERY  IP CONSULT TO CASE MANAGEMENT  IP CONSULT TO ORAL AND MAXILLOFACIAL SURGERY    Vitals:    09/20/21 0240 09/20/21 0702 09/20/21 1020 09/20/21 1445   BP: 115/74 117/75 120/79 125/80   Pulse: 75 77 84 89   Resp:  17 17 18   Temp: 98 °F (36 7 °C) (!) 97 3 °F (36 3 °C) 97 5 °F (36 4 °C) 98 °F (36 7 °C)   TempSrc:       SpO2: 97% 94% 94% 94%   Weight:       Height:           Most recent labs:    Recent Labs     09/20/21  0441   WBC 9 93   HGB 15 0   HCT 44 3      K 3 6   CALCIUM 8 6   BUN 14   CREATININE 0 66       Scheduled Meds:  Current Facility-Administered Medications   Medication Dose Route Frequency Provider Last Rate    acetaminophen  975 mg Oral Q8H Dale Molina MD      diazepam  10 mg Oral Once PRN Rocky Bond MD      enoxaparin  30 mg Subcutaneous Q12H Dale Molina MD      HYDROmorphone  0 5 mg Intravenous Q1H PRN MD Koki Schulzos methocarbamol  500 mg Oral Q6H Kerri Ewing MD      nicotine  1 patch Transdermal Daily Yolanda Morales MD      ondansetron  4 mg Intravenous Q6H PRN Yolanda Morales MD      oxyCODONE  10 mg Oral Q4H PRN Yolanda Morales MD      oxyCODONE  5 mg Oral Q4H PRN Yolanda Morales MD       Continuous Infusions:   PRN Meds: diazepam    HYDROmorphone    ondansetron    oxyCODONE    oxyCODONE    Surgical procedures (if appropriate):

## 2021-09-20 NOTE — RESTORATIVE TECHNICIAN NOTE
Restorative Technician Note      Patient Name: Natalia Biswas     Restorative Tech Visit Date: 09/20/21  Note Type: Bracing, Initial consult  Patient Position Upon Consult: Supine  Brace Applied: [place] Collar Set (level 1)  Patient Position When Brace Applied: Supine  Education Provided: Yes  Patient Position at End of Consult: Supine; All needs within reach;Bed/Chair alarm activated  Nurse Communication: Nurse aware of consult, application of brace      Please call orthotech at extension [phone] with any questions or adjustments      GAGE Tran

## 2021-09-20 NOTE — UTILIZATION REVIEW
Initial Clinical Review  PATIENT WAS UNDER OBSERVATION STATUS 9/19/21 CONVERTED TO INPATIENT ADMISSION 9/20/21 AS PT STILL WITH SEVERE PAIN LEFT POSTERIOR SCAPULAR REGION   CERVICAL MRI WAS INCOMPLETE   STILL REQUIRING IV DILAUDID      Admission: Date/Time/Statement:   Admission Orders (From admission, onward)     Ordered        09/20/21 1610  Inpatient Admission  Once         09/19/21 1701  Place in Observation  Once                   Orders Placed This Encounter   Procedures    Place in Observation     Standing Status:   Standing     Number of Occurrences:   1     Order Specific Question:   Level of Care     Answer:   Level 2 Stepdown / HOT [14]    Inpatient Admission     Standing Status:   Standing     Number of Occurrences:   1     Order Specific Question:   Level of Care     Answer:   Level 2 Stepdown / HOT [14]     Order Specific Question:   Estimated length of stay     Answer:   More than 2 Midnights     Order Specific Question:   Certification     Answer:   I certify that inpatient services are medically necessary for this patient for a duration of greater than two midnights  See H&P and MD Progress Notes for additional information about the patient's course of treatment  ED Arrival Information     Expected Arrival Acuity    9/19/2021 9/19/2021 16:26 Emergent         Means of arrival Escorted by Service Admission type    [facility] Trauma Urgent         Arrival complaint    Fall, C6 Fracture, T12 Fracture, Chest Wall Hematoma         Chief Complaint   Patient presents with    Fall     pt fell approximately 15 feet out of tree stand  transfer from [place], c6 and t12 fx       Initial Presentation:   64 y o  male who presents as a transfer from [facility] in [place] after falling 15 ft straight to ground today  Unknown head impact, denies LOC  Patient states he stood up after falling and felt winded   Endorses neck pain worse with movement, back pain worse with movement and severe, constant left shoulder pain  He is able to move his shoulder in all directions  He additionally is noted to have lost his left lower tooth  He denies chest pain, SOB, headache, abd pain, difficulty breathing  He otherwise has no known medical problems, does not take medication at home  He is an active smoker (1pk/day)  Trauma Active Problems:   T12 superior end plate compression fracture   Acute anterior superior corner fracture of left C6 body  Non displaced fracture of left C6 articular pillar   Chest wall hematoma  Trauma Plan:   · Admit to trauma  · Images prior to transfer reviewed  · CTA Neck w/ contrast  · Admit level 2 Stepdown, HOT protocol  · Consult neurosurgery  · C Spine collar, C spine collar precautions  · Tetanus  · Consult OMFS  · Oral Maxillofacial sSurgery consult  Plan:  - No OMFS intervention warranted  - Maintain oral hygiene as inpatient  - Maintain C-spine until cleared  - Neuro checks per trauma/neuro  - Follow up at outpatient [facility] clinic -- patient can call [phone] to schedule an appointment for 5 days after discharge for comprehensive exam and radiographs  D/w OMFS attg on call, Dr Earl Angela    Date:9/20/21    Day 1: CONVERTED TO [address]  This 78-year-old fell down out of a tree after trying to assemble hunting platform  Landed on bottom and left shoulder  Complaining of severe pain in left posterior scapular tip region   Also paresthesia down the left arm into the hand and fingers improveing   This is likely small nondisplaced slightly oblique rostral caudal fracture and superior endplate of left C6 facet    No disruption of facet joint C6-7  Attempted MRI cervical incomplete secondary to pain   It does show that there is moderately severe stenosis of the left subclavian artery from quite hypertrophied median scalene muscle attaching down to rib 2   Similar process less severe on the right side  He may have some some partial brachial plexus traction secondary to scalene muscular fixation distally    Agree with management CS cervical collar Vista with then upright x-rays cervical AP and lateral  And also TLSO brace T12 fracture without displacement  Pedicles look intact  Will his pain is under better control the MRI cervical spine could then be redone  Also pending left brachial plexus MRI to evaluate for potential thoracic outlet syndrome  DATE : 9/21/21 DAY 2  Neurosurgery  C6 cervical fracture [facility])  Assessment & Plan  Anterior superior corner fracture of left C6 vertebral body and nondisplaced fracture of left C6 articular pillar  Status post fall from 15 ft  No home blood thinners seen in chart  Patient also sustained SEP T12 compression fracture  MRI cervical spine wo 09/20/2021:Limited on diagnostic sagittal T1 and T2 imaging only obtained demonstrates no evidence of spinal cord compression   There is spinal stenosis at C5-6 and C6-7    Plan:  · Continue neurological checks  · Reviewed imaging with patient and wife  · Vista collar to be worn at all times, okay for [place] collar for showering  · Medical management and pain control per primary team  · No neurosurgical intervention warranted at this time  · No need for repeat MRI cervical spine at this time  · Mobilize with PT/OT with brace and collar  · Recommend outpatient orthopedic referral for left shoulder pain  DVT PPX: SCDs and SQ lovenox  ED Triage Vitals   Temperature Pulse Respirations Blood Pressure SpO2   09/19/21 1630 09/19/21 1630 09/19/21 1630 09/19/21 1630 09/19/21 1630   (!) 97 4 °F (36 3 °C) 86 16 148/91 100 %      Temp Source Heart Rate Source Patient Position - Orthostatic VS BP Location FiO2 (%)   09/19/21 1630 09/19/21 1630 -- 09/20/21 2353 --   Tympanic Monitor  Right arm       Pain Score       09/19/21 1950       9          Wt Readings from Last 1 Encounters:   09/19/21 88 kg (194 lb)     Additional Vital Signs:   09/20/21 07:02:32  97 3 °F (36 3 °C)Abnormal   77  17 117/75  89  94 %  --   09/20/21 02:40:52  98 °F (36 7 °C)  75  --  115/74  88  97 %  --   09/19/21 21:58:48  --  90  19  115/72  86  96 %  --   09/19/21 19:09:53  --  76  18  136/94  108  97 %  None (Room air)   09/19/21 18:09:29  --  76  16  147/96  --  100 %  None (Room air)   09/19/21 1745  --  82  16  149/100  --  100 %         Pertinent Labs/Diagnostic Test Results:   9/19/21 cta neck  No evidence of pseudoaneurysm, dissection or occlusion of the carotid or vertebral arteries to suggest acute vascular trauma  2   Incidental severe left subclavian artery stenosis due to impingement by the scalene muscles  Thoracic outlet syndrome is possible in the appropriate clinical context  3   No evidence of acute cervical spine fracture  CT brain No acute intracranial abnormality  CT facial bones Missing mandibular left central incisor tooth with tiny locules of gas in the tooth socket, likely from recent trauma  No acute maxillofacial fracture  Old depressed right medial orbital wall fracture with orbital fat herniation  CT spine cervical - No cervical spine fracture or traumatic malalignment  - Probable focal enthesitis posterior to C6 spinous process   Given tiny locule of gas in this region, infection or tiny radiolucent foreign body should be considered  The study was marked in [facility] for immediate notification  CT chest abd pelvis   No acute visceral organ or vascular injury of the chest, abdomen, or pelvis  Multiple acute fractures:   - Acute anterior superior corner fracture of C6 vertebral body      - Acute nondisplaced fracture of left C6 articular pillar  - Acute T12 superior endplate compression fracture with mild height loss  Small subcutaneous hematoma in left anterolateral chest region     Results from last 7 days   Lab Units 09/20/21  0441 09/19/21  1337   WBC Thousand/uL 9 93 15 90*   HEMOGLOBIN g/dL 15 0 15 1   HEMATOCRIT % 44 3 43 9   PLATELETS Thousands/uL 291 317   NEUTROS ABS Thousands/µL 7 75* 13 38*     Results from last 7 days   Lab Units 09/20/21  0441 09/19/21  1337   SODIUM mmol/L 137 142   POTASSIUM mmol/L 3 6 3 4*   CHLORIDE mmol/L 109* 106   CO2 mmol/L 24 22   ANION GAP mmol/L 4 14*   BUN mg/dL 14 15   CREATININE mg/dL 0 66 1 03   EGFR ml/min/1 73sq m 108 81   CALCIUM mg/dL 8 6 9 0   MAGNESIUM mg/dL 2 4  --    PHOSPHORUS mg/dL 2 9  --      Results from last 7 days   Lab Units 09/20/21  0441 09/19/21  1337   GLUCOSE RANDOM mg/dL 82 92     ED Treatment:   Medication Administration from 09/19/2021 1529 to 09/19/2021 1901       Date/Time Order Dose Route Action Action by Comments     09/19/2021 1738 HYDROmorphone (DILAUDID) injection 1 mg 1 mg Intravenous Given Leonela Jane RN      09/19/2021 1817 methocarbamol (ROBAXIN) tablet 500 mg 0 mg Oral Hold Leonela Jane RN states he does not want this medication at this time     09/19/2021 1737 ondansetron (ZOFRAN) injection 4 mg 4 mg Intravenous Given Leonela Jane RN      09/19/2021 1759 iodixanol (VISIPAQUE) 320 MG/ML injection 85 mL 85 mL Intravenous Given Edith Alves         History reviewed  No pertinent past medical history  Present on Admission:  **None**      Admitting Diagnosis: Other closed nondisplaced [facility] sixth [facility] [S12 591A]  Closed traumatic fracture of tooth, initial encounter [S02  5XXA]  Age/Sex: 64 y o  male  Admission Orders:  Neuro checks  Regular diet   Scheduled Medications:  acetaminophen, 975 mg, Oral, Q8H MARCUS  enoxaparin, 30 mg, Subcutaneous, Q12H MARCUS  methocarbamol, 500 mg, Oral, Q6H [address]  nicotine, 1 patch, Transdermal, Daily      Continuous IV Infusions:     PRN Meds:  diazepam, 10 mg, Oral, Once PRN  HYDROmorphone, 0 5 mg, Intravenous, Q1H PRN X2  ondansetron, 4 mg, Intravenous, Q6H PRN  oxyCODONE, 10 mg, Oral, Q4H PRN X4  oxyCODONE, 5 mg, Oral, Q4H PRN        IP CONSULT TO NEUROSURGERY  IP CONSULT TO CASE MANAGEMENT  IP CONSULT TO ORAL AND MAXILLOFACIAL SURGERY    Network Utilization Review Department  ATTENTION: Please call with any questions or concerns to [phone] and carefully listen to the prompts so that you are directed to the right person  All voicemails are confidential   Sabine Erickson all requests for admission clinical reviews, approved or denied determinations and any other requests to dedicated fax number below belonging to the campus where the patient is receiving treatment   List of dedicated fax numbers for the Facilities:  [address] DENIALS (Administrative/Medical Necessity) [phone]   [address] (Maternity/NICU/Pediatrics) [phone]   [address] [place] [address] [address] [place] [address] [id number] [place] [address] [address] [facility] [place] [address] [phone]

## 2021-09-20 NOTE — ASSESSMENT & PLAN NOTE
Pleasant 78-year-old male that is status post fall from 15 ft  · No home blood thinners seen in chart  · Presents with left shoulder pain with radiation of tingling only down to his left forearm  · GCS 15; BROWN 5/5  · Concern for C6 fx and T12 fx; however upon review with attending maciel of concern of osteophyte fx in region of T12  Imaging:    CT cervical 9/19/21: ADDENDUM: Upon further review, Acute anterior superior corner fracture of the left C6 vertebral body (602:86)  Acute nondisplaced fracture of left C6 articular pillar  CT head 9/19/21: No acute intracranial abnormality  CTCAP 9/19/21: No acute visceral organ or vascular injury of the chest, abdomen, or pelvis  Multiple acute fractures: Acute anterior superior corner fracture of C6 vertebral body  Acute nondisplaced fracture of left C6 articular pillar  Acute T12 superior endplate compression fracture with mild height loss  Small subcutaneous hematoma in left anterolateral chest region  After review with attending, believe that there is an osteophyte complex ventrally with fracture as opposed to a T12 compression fx  However, given localized pain at that region will recommend TLSO brace likewise           Plan:  · TLSO brace ordered for comfort PRN  · Aspen collar to be worn at all times  · [place]ah collar for hygiene/showerin  · Ordered upright cervical and thoracolumbar spine XR AP/lateral  · Mobilize with PT/OT with braces  · DVT PPX: SCDs and clear to initiate pharmacological DVT  · In agreement with MRI cervical w/o contrast to assess for ligamentous damage given change in neuro exam       Neurosurgery will continue to follow once MRI cervical w/o contrast is completed and baseline upright x-rays are completed       Plan of care discussed with trauma member, Anay Bond NP

## 2021-09-21 VITALS
TEMPERATURE: 97.9 F | RESPIRATION RATE: 17 BRPM | DIASTOLIC BLOOD PRESSURE: 95 MMHG | HEIGHT: 70 IN | WEIGHT: 194 LBS | SYSTOLIC BLOOD PRESSURE: 141 MMHG | BODY MASS INDEX: 27.77 KG/M2 | HEART RATE: 88 BPM | OXYGEN SATURATION: 99 %

## 2021-09-21 PROBLEM — M25.512 LEFT SHOULDER PAIN: Status: ACTIVE | Noted: 2021-09-21
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
## 2021-09-21 PROCEDURE — 99238 HOSP IP/OBS DSCHRG MGMT 30/<: CPT | Performed by: SURGERY

## 2021-09-21 PROCEDURE — 99232 SBSQ HOSP IP/OBS MODERATE 35: CPT | Performed by: NURSE PRACTITIONER

## 2021-09-21 RX ORDER — METHOCARBAMOL 750 MG/1
750 TABLET, FILM COATED ORAL EVERY 6 HOURS PRN
Qty: 56 TABLET | Refills: 0 | Status: SHIPPED | OUTPATIENT
Start: 2021-09-21 | End: 2021-10-05 | Stop reason: SDUPTHER

## 2021-09-21 RX ORDER — METHOCARBAMOL 750 MG/1
750 TABLET, FILM COATED ORAL EVERY 6 HOURS SCHEDULED
Status: DISCONTINUED | OUTPATIENT
Start: 2021-09-21 | End: 2021-09-21 | Stop reason: HOSPADM

## 2021-09-21 RX ORDER — ACETAMINOPHEN 325 MG/1
975 TABLET ORAL EVERY 8 HOURS SCHEDULED
Refills: 0
Start: 2021-09-21

## 2021-09-21 RX ORDER — OXYCODONE HYDROCHLORIDE 10 MG/1
TABLET ORAL
Qty: 21 TABLET | Refills: 0 | Status: SHIPPED | OUTPATIENT
Start: 2021-09-21 | End: 2021-12-06 | Stop reason: ALTCHOICE

## 2021-09-21 RX ADMIN — ACETAMINOPHEN 975 MG: 325 TABLET, FILM COATED ORAL at 05:36

## 2021-09-21 RX ADMIN — METHOCARBAMOL TABLETS 750 MG: 750 TABLET, COATED ORAL at 11:40

## 2021-09-21 RX ADMIN — OXYCODONE HYDROCHLORIDE 10 MG: 10 TABLET ORAL at 09:45

## 2021-09-21 RX ADMIN — OXYCODONE HYDROCHLORIDE 10 MG: 10 TABLET ORAL at 05:35

## 2021-09-21 RX ADMIN — METHOCARBAMOL 500 MG: 500 TABLET, FILM COATED ORAL at 05:36

## 2021-09-21 NOTE — ASSESSMENT & PLAN NOTE
· T12 endplate fracture  · Neurosurgery consulted note appreciated  · TLSO brace for comfort  · Upright XR is improved  · Follow-up with Neurosurgery in 2 weeks

## 2021-09-21 NOTE — DISCHARGE INSTRUCTIONS
Neurosurgery discharge instructions following neck fracture:      VISTA collar at all times except for showering change to dorita (peach) collar   No bending, twisting or heavy lifting  No pushing or pulling over 10lbs  No strenuous activities  NO DRIVING  **Please notify MD immediately if you have increased neck or arm pain  New numbness and/or weakness in your arm  Difficulty swallowing or breathing especially while lying down  Numbness or weakness in arms or legs  Increased difficulty walking **    Neurosurgery discharge instructions following spine fracture:      TLSO brace to be worn when out of bed of head of bed greater than 45 degrees  May place brace on while sitting on edge of bed  May be removed for showering   No bending, twisting or heavy lifting  No strenuous activities  No Driving   Follow-up as scheduled in two weeks with repeat spine x-rays to be completed 2-3 days prior to visit  Prescription has been entered electronically  **Please notify MD immediately if you have increased back or leg pain   New numbness, tingling, weakness in your legs and/or bowel/bladder incontinence**

## 2021-09-21 NOTE — ASSESSMENT & PLAN NOTE
Small subcutaneous hematoma in left anterolateral chest region  · Continue to monitor  · Hgb stable at 15

## 2021-09-21 NOTE — PROGRESS NOTES
[address] [facility]  Progress Note Valentino Kinds 1965, 64 y o  male MRN: [id number]  Unit/Bed#: [facility] 602-01 Encounter: [id number]  Primary Care Provider: No primary care provider on file  Date and time admitted to hospital: 9/19/2021  4:26 PM    * C6 cervical fracture [facility])  Assessment & Plan  Anterior superior corner fracture of left C6 vertebral body and nondisplaced fracture of left C6 articular pillar  · Status post fall from 15 ft  · No home blood thinners seen in chart  · Patient also sustained SEP T12 compression fracture    Imaging:    CT cervical 9/19/21: ADDENDUM: Upon further review, Acute anterior superior corner fracture of the left C6 vertebral body (602:86)  Acute nondisplaced fracture of left C6 articular pillar  Per review with attending there is oblique C7 fx seen  Cervical spine x-ray 09/19/2021:The patient's known nondisplaced C6 fractures are not visible  CTA neck w/wo 09/19/2021:No evidence of pseudoaneurysm, dissection or occlusion of the carotid or vertebral arteries to suggest acute vascular trauma  Incidental severe left subclavian artery stenosis due to impingement by the scalene muscles  Thoracic outlet syndrome is possible in the appropriate clinical context  No evidence of acute cervical spine fracture  MRI cervical spine wo 09/20/2021:Limited on diagnostic sagittal T1 and T2 imaging only obtained demonstrates no evidence of spinal cord compression  There is spinal stenosis at C5-6 and C6-7      Plan:  · Continue neurological checks  · Reviewed imaging with patient and wife  Vista collar to be worn at all times, okay for [employer] collar for showering  Medical management and pain control per primary team  No neurosurgical intervention warranted at this time  No need for repeat MRI cervical spine at this time  Mobilize with PT/OT with brace and collar  Recommend outpatient orthopedic referral for left shoulder pain  DVT PPX: SCDs and SQ [facility]    Neurosurgery will sign off, patient will follow-up in 2 weeks with repeat cervical and thoracolumbar spine x-rays, call with any further questions or concerns  Compression fracture of body of thoracic vertebra (HCC)  Assessment & Plan  T12 SEP compression fracture noted on imaging    Imaging:    CT chest abdomen pelvis w 09/19/2021:No acute visceral organ or vascular injury of the chest, abdomen, or pelvis  Multiple acute fractures:Acute anterior superior corner fracture of C6 vertebral body  Acute nondisplaced fracture of left C6 articular pillar  Acute T12 superior endplate compression fracture with mild height loss  Small subcutaneous hematoma in left anterolateral chest region  Thoracolumbar x-ray 09/20/2021:Stable mild superior endplate fracture at K29 better seen on CT    Plan:  · TLSO brace ordered to be worn when OOB or HOB >45 degrees  · See above plan        Subjective/Objective     Chief Complaint: "Can I go home?"    Subjective:  Patient states he feels well  He currently denies any neck pain but does report if he were to tilt his head forward he develops some neck pain which will go across his posterior shoulders but denies any radiation into his arms  Patient does also endorse some left-sided neck pain which radiates in his shoulder  Patient reports the numbness and tingling in his left arm has resolved  He also currently reports his low back pain 2/10 without radiation  He also reports isolated left shoulder pain  He denies any headaches, dizziness, blurry vision, chest pain, shortness of breath, abdominal pain, nausea, vomiting, diarrhea, no problems with bowel or bladder, no new weakness or numbness/tingling  Objective:  Patient comfortably sitting out in chair with vista collar and TLSO brace in place, NAD  I/O       09/19 0701 - 09/20 0700 09/20 0701 - 09/21 0700 09/21 0701 - 09/22 0700    P  O   805 360    Total Intake(mL/kg)  805 (9 1) 360 (4 1)    Urine (mL/kg/hr) 550 1375 (0 7)     Total Output 550 1375     Net -550 -570 +360           Unmeasured Urine Occurrence  1 x 1 x          Invasive Devices     Peripheral Intravenous Line            Peripheral IV 09/19/21 Distal;Left;Upper;Ventral (anterior) Arm 1 day    Peripheral IV 09/19/21 Right Arm 1 day                Physical Exam:  Vitals: Blood pressure 141/95, pulse 88, temperature 97 9 °F (36 6 °C), resp  rate 17, height 5' 10" (1 778 m), weight 88 kg (194 lb), SpO2 99 %  ,Body mass index is 27 84 kg/m²        General appearance: alert, appears stated age, cooperative and no distress  Head: Normocephalic, without obvious abnormality, atraumatic  Eyes: EOMI, PERRL, conjugate gaze  Neck:  Dennison collar in place, no tenderness to palpation  Back:  TLSO brace in place, no tenderness to palpation  Lungs: non labored breathing  Heart: regular heart rate  Neurologic:   Mental status: Alert, oriented x3, thought content appropriate, speech is clear, following commands  Cranial nerves: grossly intact (Cranial nerves II-XII)  Sensory: normal to LT in all extremities x4, JPS and DST intact  Motor: moving all extremities without focal weakness, strength 5/5 throughout  Reflexes: 2+ and symmetric, no Goss's or clonus appreciated  Coordination: finger to nose normal bilaterally, no drift bilaterally      Lab Results:  Results from last 7 days   Lab Units 09/20/21  0441 09/19/21  1337   WBC Thousand/uL 9 93 15 90*   HEMOGLOBIN g/dL 15 0 15 1   HEMATOCRIT % 44 3 43 9   PLATELETS Thousands/uL 291 317   NEUTROS PCT % 78* 84*   MONOS PCT % 6 5     Results from last 7 days   Lab Units 09/20/21  0441 09/19/21  1337   POTASSIUM mmol/L 3 6 3 4*   CHLORIDE mmol/L 109* 106   CO2 mmol/L 24 22   BUN mg/dL 14 15   CREATININE mg/dL 0 66 1 03   CALCIUM mg/dL 8 6 9 0     Results from last 7 days   Lab Units 09/20/21  0441   MAGNESIUM mg/dL 2 4     Results from last 7 days   Lab Units 09/20/21  0441   PHOSPHORUS mg/dL 2 9         No results found for: TROPONINT  ABG:No results found for: PHART, XSV5MKL, PO2ART, OAA5OBR, P7KMHVLG, BEART, SOURCE    Imaging Studies: I have personally reviewed pertinent reports  and I have personally reviewed pertinent films in PACS    XR chest 1 view portable    Result Date: 9/20/2021  Impression: No acute cardiopulmonary disease  Workstation performed: [id number]     XR spine cervical 2 or 3 vw injury    Result Date: 9/20/2021  Impression: The patient's known nondisplaced C6 fractures are not visible  Workstation performed: [id number]     XR spine thoracolumbar 2 vw    Result Date: 9/21/2021  Impression: Stable mild superior endplate fracture at T83 better seen on CT  Workstation performed: [id number]     XR shoulder 2+ views LEFT    Result Date: 9/20/2021  Impression: No acute osseous abnormality  Workstation performed: [id number]     CT head without contrast    Result Date: 9/19/2021  Impression: No acute intracranial abnormality  Workstation performed: [id number]     CT facial bones without contrast    Result Date: 9/19/2021  Impression: Missing mandibular left central incisor tooth with tiny locules of gas in the tooth socket, likely from recent trauma  No acute maxillofacial fracture  Old depressed right medial orbital wall fracture with orbital fat herniation  Workstation performed: [id number]     CTA neck w wo contrast    Result Date: 9/19/2021  Impression: 1  No evidence of pseudoaneurysm, dissection or occlusion of the carotid or vertebral arteries to suggest acute vascular trauma  2   Incidental severe left subclavian artery stenosis due to impingement by the scalene muscles  Thoracic outlet syndrome is possible in the appropriate clinical context  3   No evidence of acute cervical spine fracture   Workstation performed: [id number]     CT spine cervical without contrast    Addendum Date: 9/19/2021    ADDENDUM: I personally discussed this study with Juan Ramon Jon on 9/19/2021 at 2:44 PM  Addendum Date: 9/19/2021    ADDENDUM: Upon further review, -Acute anterior superior corner fracture of the left C6 vertebral body (602:86)  -Acute nondisplaced fracture of left C6 articular pillar (602:69)  Result Date: 9/19/2021  Impression: - No cervical spine fracture or traumatic malalignment  - Probable focal enthesitis posterior to C6 spinous process  Given tiny locule of gas in this region, infection or tiny radiolucent foreign body should be considered  The study was marked in [facility] for immediate notification  Workstation performed: [id number]     CT chest abdomen pelvis w contrast    Result Date: 9/19/2021  Impression: No acute visceral organ or vascular injury of the chest, abdomen, or pelvis  Multiple acute fractures: - Acute anterior superior corner fracture of C6 vertebral body  - Acute nondisplaced fracture of left C6 articular pillar  - Acute T12 superior endplate compression fracture with mild height loss  Small subcutaneous hematoma in left anterolateral chest region  I personally discussed this study with Elicia Fuller on 9/19/2021 at 2:44 PM  Workstation performed: [id number]     CT recon only lumbar spine    Addendum Date: 9/19/2021    ADDENDUM: I personally discussed this study with Elicia Fuller on 9/19/2021 at 2:44 PM      Result Date: 9/19/2021  Impression: Acute T12 superior endplate compression fracture with mild vertebral body height loss  No acute osseous abnormality of lumbar spine  Multilevel degenerative changes of lumbar spine, severe at L4-L5  Please see dedicated CT chest abdomen pelvis with contrast for further evaluation  Workstation performed: [id number]     CT recon only thoracolumbar (No Charge)    Addendum Date: 9/19/2021    ADDENDUM: I personally discussed this study with Elicia Fuller on 9/19/2021 at 2:44 PM      Result Date: 9/19/2021  Impression: Acute T12 superior endplate compression fracture with mild vertebral body height loss   Workstation performed: [id number]       EKG, Pathology, and Other Studies: I have personally reviewed pertinent reports        VTE Pharmacologic Prophylaxis: Enoxaparin (Lovenox)    VTE Mechanical Prophylaxis: sequential compression device

## 2021-09-21 NOTE — ASSESSMENT & PLAN NOTE
Acute anterior superior corner fracture of the left C6 vertebral body  Acute nondisplaced fracture of left C6 articular pillar   - Neurosurgery consulted and note appreciated  - Bracing: Maintain cervical collar   - Spine precautions  - continue to monitor neurological exam   - Multimodal analgesic regimen as needed  - PT and OT evaluation and treatment as indicated  - Outpatient follow up with Neurosurgery for re-evaluation

## 2021-09-21 NOTE — CASE MANAGEMENT
Pt is NOT A Bundle  Pt is NOT A 30 Day Readmission    Pt was evaluated by therapy and pt was cleared for a home d/c with eventual OP therapy  CM will continue to follow for potential d/c recs  If medically stable, pt could d/c home today or tomorrow  Cm will follow    CM reviewed d/c planning process including the following: identifying help at home, patient preference for d/c planning needs, Discharge Lounge, [facility] Meds to Bed program, availability of treatment team to discuss questions or concerns patient and/or family may have regarding understanding medications and recognizing signs and symptoms once discharged  CM also encouraged patient to follow up with all recommended appointments after discharge  Patient advised of importance for patient and family to participate in managing patients medical well being

## 2021-09-21 NOTE — DISCHARGE SUMMARY
[address] [facility]  Discharge- Reche Dire 1965, 64 y o  male MRN: [id number]  Unit/Bed#: [facility] 602-01 Encounter: [id number]  Primary Care Provider: No primary care provider on file  Date and time admitted to hospital: 9/19/2021  4:26 PM    [address]  · Fall from 15 feet  · Injuries listed below  · PT/OT    * C6 cervical fracture [facility])  Assessment & Plan  Acute anterior superior corner fracture of the left C6 vertebral body  Acute nondisplaced fracture of left C6 articular pillar   - Neurosurgery consulted and note appreciated  - Bracing: Maintain cervical collar   - Spine precautions  - continue to monitor neurological exam   - Multimodal analgesic regimen as needed  - PT and OT evaluation and treatment as indicated  - Outpatient follow up with Neurosurgery for re-evaluation  Compression fracture of body of thoracic vertebra (HCC)  Assessment & Plan  · T12 endplate fracture  · Neurosurgery consulted note appreciated  · TLSO brace for comfort  · Upright XR is improved  · Follow-up with Neurosurgery in 2 weeks  Left shoulder pain  Assessment & Plan  · Left shoulder pain following fall  · Left shoulder XR negative for acute traumatic injury/dislocation  · Full ROM  no weakness  No numbness  · Pain is improved today  · Continue multimodal pain regimen  · Follow-up with PCP as an outpatient       Subcutaneous hematoma  Assessment & Plan  Small subcutaneous hematoma in left anterolateral chest region  · Continue to monitor  · Hgb stable at 15    Loss of single tooth  Assessment & Plan  Missing mandibular left central incisor tooth with tiny locules of gas in the tooth socket s/p fall  · OMFS consulted  · No OMFS intervention warranted  · Maintain oral hygiene as inpatient  · Follow up at outpatient [facility] clinic -- patient can call [phone] to schedule an appointment for 5 days after discharge for comprehensive exam and radiographs              Medical Problems     Resolved Problems  Date Reviewed: 9/21/2021    None                Admission Date:   Admission Orders (From admission, onward)     Ordered        09/20/21 1610  Inpatient Admission  Once         09/19/21 1701  Place in Observation  Once                     Admitting Diagnosis: Other closed nondisplaced fracture of sixth cervical vertebra, initial encounter ([facility] [address] ) [S12 591A]  Closed traumatic fracture of tooth, initial encounter [S02  5XXA]    HPI: Tiffany Castrejon is a 64 y o  male who presents as a transfer from [facility] after falling 15 ft straight to ground today  Unknown head impact, denies LOC  Patient states he stood up after falling and felt winded  Endorses neck pain worse with movement, back pain worse with movement and severe, constant left shoulder pain  He is able to move his shoulder in all directions  He additionally is noted to have lost his left lower tooth  He denies chest pain, SOB, headache, abd pain, difficulty breathing  He otherwise has no known medical problems, does not take medication at home  He is an active smoker (1pk/day)  - Per Dr Blue [place] H&P on 9/19       Subjective:  Patient continues to complain of left upper shoulder pain  Describes having full range of motion and the pain being intermittent  He states that the pain is better with active motion  He states that the pain management is currently assisting with his discomfort  He denies any other complaints  He states that he feels comfortable being discharged later today  Objective:  General:  No acute distress  Neuro:  GCS 15  Light touch sensation intact throughout  No focal weakness  HEENT:  Normocephalic, atraumatic  Cervical collar in place  Heart:  Regular rate and rhythm  No murmur, no rub, no gallop  Lungs:  Clear to auscultation, bilaterally  No wheezing, no rhonchi, no rales  Abdomen:  Soft, nontender  Pelvis:  Stable    Extremities:  Moving all extremities with full strength, 5/5  No deformities  Skin:  Warm, dry, well perfused  Procedures Performed: No orders of the defined types were placed in this encounter  Summary of Hospital Course:  Patient was admitted to the trauma service with neurosurgical consultation  Neurosurgery evaluated the patient in fitted him with a TLSO brace for his T12 superior endplate fracture, which she to wear for comfort  They also fitted him with a an [address] collar for his C6 fracture, that he is to wear at all times  Upright x-rays were obtained for both injuries which showed stability is with bracing  Patient is to follow up with Neurosurgery as an outpatient in 2 weeks with repeat imaging  Patient was also evaluated with an MRI of his C-spine due to complaints of left arm numbness  Numbness spontaneously resolved  MRI was sub diagnostic but no spinal cord injury was seen on the images obtained  Neurosurgery signed off patient and no additional inpatient imaging was requested  Patient vital signs have been stable and his pain has been controlled with his current pain regimen  Patient was evaluated by PT/OT and cleared for discharge  Discharge instructions, follow up, discharge medications were all discussed with patient who verbally confirmed understanding  He denies any questions time of discharge  Significant Findings, Care, Treatment and Services Provided:   XR chest 1 view portable    Result Date: 9/20/2021  Impression: No acute cardiopulmonary disease  Workstation performed: [id number]     XR spine cervical 2 or 3 vw injury    Result Date: 9/20/2021  Impression: The patient's known nondisplaced C6 fractures are not visible  Workstation performed: [id number]     XR spine thoracolumbar 2 vw    Result Date: 9/21/2021  Impression: Stable mild superior endplate fracture at X95 better seen on CT   Workstation performed: [id number]     XR shoulder 2+ views LEFT    Result Date: 9/20/2021  Impression: No acute osseous abnormality  Workstation performed: [id number]     CT head without contrast    Result Date: 9/19/2021  Impression: No acute intracranial abnormality  Workstation performed: [id number]     CT facial bones without contrast    Result Date: 9/19/2021  Impression: Missing mandibular left central incisor tooth with tiny locules of gas in the tooth socket, likely from recent trauma  No acute maxillofacial fracture  Old depressed right medial orbital wall fracture with orbital fat herniation  Workstation performed: [id number]     CTA neck w wo contrast    Result Date: 9/19/2021  Impression: 1  No evidence of pseudoaneurysm, dissection or occlusion of the carotid or vertebral arteries to suggest acute vascular trauma  2   Incidental severe left subclavian artery stenosis due to impingement by the scalene muscles  Thoracic outlet syndrome is possible in the appropriate clinical context  3   No evidence of acute cervical spine fracture  Workstation performed: [id number]     CT spine cervical without contrast    Addendum Date: 9/19/2021    ADDENDUM: I personally discussed this study with Debo Powell on 9/19/2021 at 2:44 PM      Addendum Date: 9/19/2021    ADDENDUM: Upon further review, -Acute anterior superior corner fracture of the left C6 vertebral body (602:86)  -Acute nondisplaced fracture of left C6 articular pillar (602:69)  Result Date: 9/19/2021  Impression: - No cervical spine fracture or traumatic malalignment  - Probable focal enthesitis posterior to C6 spinous process  Given tiny locule of gas in this region, infection or tiny radiolucent foreign body should be considered  The study was marked in [facility] for immediate notification  Workstation performed: [id number]     CT chest abdomen pelvis w contrast    Result Date: 9/19/2021  Impression: No acute visceral organ or vascular injury of the chest, abdomen, or pelvis   Multiple acute fractures: - Acute anterior superior corner fracture of C6 vertebral body  - Acute nondisplaced fracture of left C6 articular pillar  - Acute T12 superior endplate compression fracture with mild height loss  Small subcutaneous hematoma in left anterolateral chest region  I personally discussed this study with Veronica Robledo on 9/19/2021 at 2:44 PM  Workstation performed: [id number]     CT recon only lumbar spine    Addendum Date: 9/19/2021    ADDENDUM: I personally discussed this study with Veronica Robledo on 9/19/2021 at 2:44 PM      Result Date: 9/19/2021  Impression: Acute T12 superior endplate compression fracture with mild vertebral body height loss  No acute osseous abnormality of lumbar spine  Multilevel degenerative changes of lumbar spine, severe at L4-L5  Please see dedicated CT chest abdomen pelvis with contrast for further evaluation  Workstation performed: [id number]     CT recon only thoracolumbar (No Charge)    Addendum Date: 9/19/2021    ADDENDUM: I personally discussed this study with Veronicafrancisco javier Rosey on 9/19/2021 at 2:44 PM      Result Date: 9/19/2021  Impression: Acute T12 superior endplate compression fracture with mild vertebral body height loss  Workstation performed: [id number]       Complications: none    Condition at Discharge: good         Discharge instructions/Information to patient and family:   See after visit summary for information provided to patient and family  Provisions for Follow-Up Care:  See after visit summary for information related to follow-up care and any pertinent home health orders  PCP: No primary care provider on file  Disposition: Home    Planned Readmission: No    Discharge Statement   I spent 25 minutes discharging the patient  This time was spent on the day of discharge  I had direct contact with the patient on the day of discharge  Additional documentation is required if more than 30 minutes were spent on discharge       Discharge Medications:  See after visit summary for reconciled discharge medications provided to patient and family

## 2021-09-21 NOTE — ASSESSMENT & PLAN NOTE
Missing mandibular left central incisor tooth with tiny locules of gas in the tooth socket s/p fall  · OMFS consulted  · No OMFS intervention warranted  · Maintain oral hygiene as inpatient  · [place] [facility] [place] -- patient can call [phone] to schedule an appointment for 5 days after discharge for comprehensive exam and radiographs

## 2021-09-21 NOTE — ASSESSMENT & PLAN NOTE
· Left shoulder pain following fall  · Left shoulder XR negative for acute traumatic injury/dislocation  · Full ROM  no weakness  No numbness  · Pain is improved today  · Continue multimodal pain regimen  · Follow-up with PCP as an outpatient

## 2021-09-21 NOTE — PLAN OF CARE
Problem: PAIN - ADULT  Goal: Verbalizes/displays adequate comfort level or baseline comfort level  Description: Interventions:  - Encourage patient to monitor pain and request assistance  - Assess pain using appropriate pain scale  - Administer analgesics based on type and severity of pain and evaluate response  - Implement non-pharmacological measures as appropriate and evaluate response  - Consider cultural and social influences on pain and pain management  - Notify physician/advanced practitioner if interventions unsuccessful or patient reports new pain  Outcome: Progressing     Problem: SAFETY ADULT  Goal: Patient will remain free of falls  Description: INTERVENTIONS:  - Educate patient/family on patient safety including physical limitations  - Instruct patient to call for assistance with activity   - Consult OT/PT to assist with strengthening/mobility   - Keep Call bell within reach  - Keep bed low and locked with side rails adjusted as appropriate  - Keep care items and personal belongings within reach  - Initiate and maintain comfort rounds  - Make Fall Risk Sign visible to staff  - Offer Toileting every  Hours, in advance of need  - Initiate/Maintain alarm  - Obtain necessary fall risk management equipment:   - Apply yellow socks and bracelet for high fall risk patients  - Consider moving patient to room near nurses station  Outcome: Progressing     Problem: Knowledge Deficit  Goal: Patient/family/caregiver demonstrates understanding of disease process, treatment plan, medications, and discharge instructions  Description: Complete learning assessment and assess knowledge base    Interventions:  - Provide teaching at level of understanding  - Provide teaching via preferred learning methods  Outcome: Progressing     Problem: Prexisting or High Potential for Compromised Skin Integrity  Goal: Skin integrity is maintained or improved  Description: INTERVENTIONS:  - Identify patients at risk for skin breakdown  - Assess and monitor skin integrity  - Assess and monitor nutrition and hydration status  - Monitor labs   - Assess for incontinence   - Turn and reposition patient  - Assist with mobility/ambulation  - Relieve pressure over bony prominences  - Avoid friction and shearing  - Provide appropriate hygiene as needed including keeping skin clean and dry  - Evaluate need for skin moisturizer/barrier cream  - Collaborate with interdisciplinary team   - Patient/family teaching  - Consider wound care consult   Outcome: Progressing     Problem: MOBILITY - ADULT  Goal: Maintain or return to baseline ADL function  Description: INTERVENTIONS:  -  Assess patient's ability to carry out ADLs; assess patient's baseline for ADL function and identify physical deficits which impact ability to perform ADLs (bathing, care of mouth/teeth, toileting, grooming, dressing, etc )  - Assess/evaluate cause of self-care deficits   - Assess range of motion  - Assess patient's mobility; develop plan if impaired  - Assess patient's need for assistive devices and provide as appropriate  - Encourage maximum independence but intervene and supervise when necessary  - Involve family in performance of ADLs  - Assess for home care needs following discharge   - Consider OT consult to assist with ADL evaluation and planning for discharge  - Provide patient education as appropriate  Outcome: Progressing  Goal: Maintains/Returns to pre admission functional level  Description: INTERVENTIONS:  - Perform BMAT or MOVE assessment daily    - Set and communicate daily mobility goal to care team and patient/family/caregiver  - Collaborate with rehabilitation services on mobility goals if consulted  - Perform Range of Motion  times a day  - Reposition patient every  hours    - Dangle patient  times a day  - Stand patient  times a day  - Ambulate patient  times a day  - Out of bed to chair  times a day   - Out of bed for meals times a day  - Out of bed for toileting  - Record patient progress and toleration of activity level   Outcome: Progressing     Problem: NEUROSENSORY - ADULT  Goal: Achieves stable or improved neurological status  Description: INTERVENTIONS  - Monitor and report changes in neurological status  - Monitor vital signs such as temperature, blood pressure, glucose, and any other labs ordered   - Initiate measures to prevent increased intracranial pressure  - Monitor for seizure activity and implement precautions if appropriate      Outcome: Progressing  Goal: Remains free of injury related to seizures activity  Description: INTERVENTIONS  - Maintain airway, patient safety  and administer oxygen as ordered  - Monitor patient for seizure activity, document and report duration and description of seizure to physician/advanced practitioner  - If seizure occurs,  ensure patient safety during seizure  - Reorient patient post seizure  - Seizure pads on all 4 side rails  - Instruct patient/family to notify RN of any seizure activity including if an aura is experienced  - Instruct patient/family to call for assistance with activity based on nursing assessment  - Administer anti-seizure medications if ordered    Outcome: Progressing  Goal: Achieves maximal functionality and self care  Description: INTERVENTIONS  - Monitor swallowing and airway patency with patient fatigue and changes in neurological status  - Encourage and assist patient to increase activity and self care     - Encourage visually impaired, hearing impaired and aphasic patients to use assistive/communication devices  Outcome: Progressing     Problem: Potential for Falls  Goal: Patient will remain free of falls  Description: INTERVENTIONS:  - Educate patient/family on patient safety including physical limitations  - Instruct patient to call for assistance with activity   - Consult OT/PT to assist with strengthening/mobility   - Keep Call bell within reach  - Keep bed low and locked with side rails adjusted as appropriate  - Keep care items and personal belongings within reach  - Initiate and maintain comfort rounds  - Make Fall Risk Sign visible to staff  - Offer Toileting every  Hours, in advance of need  - Initiate/Maintain alarm  - Obtain necessary fall risk management equipment:   - Apply yellow socks and bracelet for high fall risk patients  - Consider moving patient to room near nurses station  Outcome: Progressing

## 2021-09-22 ENCOUNTER — TELEPHONE (OUTPATIENT)
Dept: NEUROSURGERY | Facility: CLINIC | Age: 56
End: 2021-09-22

## 2021-09-22 NOTE — TELEPHONE ENCOUNTER
09/23/2021-CALLED PT, LEFT MESSAGE ON MACHINE CONFIRMING 10/05/2021 APT IN [place] OFFICE AND TO HAVE XRAYS COMPLETED 5 DAYS PRIOR TO APT       09/22/2021-PT STILL IN HOSPITAL  10/05/2021 APT W/THORACOLUMBAR XRAY      09/21/2021-(DOMINGO)SIGN OFF, PATIENT WILL FOLLOW UP IN 2 WEEKS WITH UPRIGHT CERVICAL AND THORACOLUMBAR SPINE XRAY

## 2021-09-22 NOTE — UTILIZATION REVIEW
Notification of Discharge   This is a Notification of Discharge from our facility [address]  Please be advised that this patient has been discharge from our facility  Below you will find the admission and discharge date and time including the patients disposition  UTILIZATION REVIEW CONTACT:  Cyndie Rudolph  Utilization   Network Utilization Review Department  Phone: [phone] x carefully listen to the prompts  All voicemails are confidential   Email: [email or web address]     PHYSICIAN ADVISORY SERVICES:  FOR [facility] REVIEW - MEDICAL NECESSITY DENIAL  Phone: [phone]  Fax: [phone]  Email: [email or web address]     PRESENTATION DATE: 9/19/2021  4:26 PM  OBERVATION ADMISSION DATE:   INPATIENT ADMISSION DATE: 9/20/21  4:10 PM   DISCHARGE DATE: 9/21/2021  1:35 PM  DISPOSITION: Home/Self Care Home/Self Care      IMPORTANT INFORMATION:  Send all requests for admission clinical reviews, approved or denied determinations and any other requests to dedicated fax number below belonging to the campus where the patient is receiving treatment   List of dedicated fax numbers:  [address] DENIALS (Administrative/Medical Necessity) [phone]   1000  [facility] (Maternity/NICU/Pediatrics) [phone]   Manpreet Kaur [phone]   Xochitl Pelaez [phone]   Yaz Watkins [phone]   Shena Olivo [address] [phone]   [facility]  [phone]   [address] [place] [address]  [address] [facility] [address] [facility] [phone]

## 2021-09-30 ENCOUNTER — APPOINTMENT (OUTPATIENT)
Dept: RADIOLOGY | Facility: MEDICAL CENTER | Age: 56
End: 2021-09-30
Payer: COMMERCIAL

## 2021-09-30 DIAGNOSIS — S12.591A OTHER CLOSED NONDISPLACED FRACTURE OF SIXTH CERVICAL VERTEBRA, INITIAL ENCOUNTER (HCC): ICD-10-CM

## 2021-09-30 DIAGNOSIS — S22.000A COMPRESSION FRACTURE OF BODY OF THORACIC VERTEBRA (HCC): ICD-10-CM

## 2021-09-30 PROCEDURE — 72040 X-RAY EXAM NECK SPINE 2-3 VW: CPT

## 2021-09-30 PROCEDURE — 72080 X-RAY EXAM THORACOLMB 2/> VW: CPT

## 2021-10-05 ENCOUNTER — OFFICE VISIT (OUTPATIENT)
Dept: NEUROSURGERY | Facility: CLINIC | Age: 56
End: 2021-10-05
Payer: COMMERCIAL

## 2021-10-05 VITALS
WEIGHT: 194 LBS | TEMPERATURE: 97.2 F | SYSTOLIC BLOOD PRESSURE: 124 MMHG | HEIGHT: 70 IN | RESPIRATION RATE: 16 BRPM | HEART RATE: 81 BPM | BODY MASS INDEX: 27.77 KG/M2 | DIASTOLIC BLOOD PRESSURE: 90 MMHG

## 2021-10-05 DIAGNOSIS — S12.501D CLOSED NONDISPLACED FRACTURE OF SIXTH CERVICAL VERTEBRA WITH ROUTINE HEALING, UNSPECIFIED FRACTURE MORPHOLOGY, SUBSEQUENT ENCOUNTER: Primary | ICD-10-CM

## 2021-10-05 DIAGNOSIS — M25.512 ACUTE PAIN OF LEFT SHOULDER: ICD-10-CM
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
## 2021-10-05 DIAGNOSIS — S22.080D COMPRESSION FRACTURE OF T12 VERTEBRA WITH ROUTINE HEALING: ICD-10-CM

## 2021-10-05 DIAGNOSIS — S12.591A OTHER CLOSED NONDISPLACED FRACTURE OF SIXTH CERVICAL VERTEBRA, INITIAL ENCOUNTER (HCC): ICD-10-CM

## 2021-10-05 PROCEDURE — 99213 OFFICE O/P EST LOW 20 MIN: CPT | Performed by: PHYSICIAN ASSISTANT

## 2021-10-05 RX ORDER — METHOCARBAMOL 750 MG/1
750 TABLET, FILM COATED ORAL EVERY 6 HOURS PRN
Qty: 60 TABLET | Refills: 0 | Status: SHIPPED | OUTPATIENT
Start: 2021-10-05 | End: 2021-12-06 | Stop reason: ALTCHOICE

## 2021-10-14 ENCOUNTER — OFFICE VISIT (OUTPATIENT)
Dept: OBGYN CLINIC | Facility: MEDICAL CENTER | Age: 56
End: 2021-10-14
Payer: COMMERCIAL

## 2021-10-14 VITALS
HEART RATE: 80 BPM | DIASTOLIC BLOOD PRESSURE: 74 MMHG | HEIGHT: 70 IN | SYSTOLIC BLOOD PRESSURE: 111 MMHG | BODY MASS INDEX: 27.77 KG/M2 | WEIGHT: 194 LBS

## 2021-10-14 DIAGNOSIS — M25.512 ACUTE PAIN OF LEFT SHOULDER: ICD-10-CM

## 2021-10-14 DIAGNOSIS — S43.102A ACROMIOCLAVICULAR JOINT SEPARATION, LEFT, INITIAL ENCOUNTER: Primary | ICD-10-CM

## 2021-10-14 DIAGNOSIS — R23.8 SKIN IRRITATION: ICD-10-CM

## 2021-10-14 PROCEDURE — 20605 DRAIN/INJ JOINT/BURSA W/O US: CPT | Performed by: ORTHOPAEDIC SURGERY

## 2021-10-14 PROCEDURE — 99243 OFF/OP CNSLTJ NEW/EST LOW 30: CPT | Performed by: ORTHOPAEDIC SURGERY

## 2021-10-14 RX ORDER — TRIAMCINOLONE ACETONIDE 40 MG/ML
40 INJECTION, SUSPENSION INTRA-ARTICULAR; INTRAMUSCULAR
Status: COMPLETED | OUTPATIENT
Start: 2021-10-14 | End: 2021-10-14

## 2021-10-14 RX ORDER — LIDOCAINE HYDROCHLORIDE 10 MG/ML
1 INJECTION, SOLUTION INFILTRATION; PERINEURAL
Status: COMPLETED | OUTPATIENT
Start: 2021-10-14 | End: 2021-10-14

## 2021-10-14 RX ADMIN — TRIAMCINOLONE ACETONIDE 40 MG: 40 INJECTION, SUSPENSION INTRA-ARTICULAR; INTRAMUSCULAR at 11:06

## 2021-10-14 RX ADMIN — LIDOCAINE HYDROCHLORIDE 1 ML: 10 INJECTION, SOLUTION INFILTRATION; PERINEURAL at 11:06

## 2021-11-01 ENCOUNTER — APPOINTMENT (OUTPATIENT)
Dept: RADIOLOGY | Facility: MEDICAL CENTER | Age: 56
End: 2021-11-01
Payer: COMMERCIAL

## 2021-11-01 DIAGNOSIS — S22.080D COMPRESSION FRACTURE OF T12 VERTEBRA WITH ROUTINE HEALING: ICD-10-CM

## 2021-11-01 DIAGNOSIS — S12.501D CLOSED NONDISPLACED FRACTURE OF SIXTH CERVICAL VERTEBRA WITH ROUTINE HEALING, UNSPECIFIED FRACTURE MORPHOLOGY, SUBSEQUENT ENCOUNTER: ICD-10-CM

## 2021-11-01 PROCEDURE — 72040 X-RAY EXAM NECK SPINE 2-3 VW: CPT

## 2021-11-01 PROCEDURE — 72100 X-RAY EXAM L-S SPINE 2/3 VWS: CPT

## 2021-11-05 ENCOUNTER — OFFICE VISIT (OUTPATIENT)
Dept: NEUROSURGERY | Facility: CLINIC | Age: 56
End: 2021-11-05
Payer: COMMERCIAL

## 2021-11-05 VITALS
BODY MASS INDEX: 27.92 KG/M2 | DIASTOLIC BLOOD PRESSURE: 93 MMHG | RESPIRATION RATE: 16 BRPM | HEART RATE: 92 BPM | SYSTOLIC BLOOD PRESSURE: 144 MMHG | WEIGHT: 195 LBS | HEIGHT: 70 IN | TEMPERATURE: 98.1 F

## 2021-11-05 DIAGNOSIS — S22.080D COMPRESSION FRACTURE OF T12 VERTEBRA WITH ROUTINE HEALING: ICD-10-CM

## 2021-11-05 DIAGNOSIS — S22.000A COMPRESSION FRACTURE OF BODY OF THORACIC VERTEBRA (HCC): ICD-10-CM

## 2021-11-05 DIAGNOSIS — S12.501D CLOSED NONDISPLACED FRACTURE OF SIXTH CERVICAL VERTEBRA WITH ROUTINE HEALING, UNSPECIFIED FRACTURE MORPHOLOGY, SUBSEQUENT ENCOUNTER: Primary | ICD-10-CM

## 2021-11-05 PROCEDURE — 99213 OFFICE O/P EST LOW 20 MIN: CPT | Performed by: PHYSICIAN ASSISTANT

## 2021-11-05 RX ORDER — UREA 10 %
1 LOTION (ML) TOPICAL DAILY
COMMUNITY

## 2021-11-16 ENCOUNTER — TELEPHONE (OUTPATIENT)
Dept: NEUROSURGERY | Facility: CLINIC | Age: 56
End: 2021-11-16

## 2021-11-19 ENCOUNTER — TELEPHONE (OUTPATIENT)
Dept: NEUROSURGERY | Facility: CLINIC | Age: 56
End: 2021-11-19

## 2021-12-01 ENCOUNTER — APPOINTMENT (OUTPATIENT)
Dept: RADIOLOGY | Facility: MEDICAL CENTER | Age: 56
End: 2021-12-01
Payer: COMMERCIAL

## 2021-12-01 DIAGNOSIS — S22.080D COMPRESSION FRACTURE OF T12 VERTEBRA WITH ROUTINE HEALING: ICD-10-CM

## 2021-12-01 DIAGNOSIS — S12.501D CLOSED NONDISPLACED FRACTURE OF SIXTH CERVICAL VERTEBRA WITH ROUTINE HEALING, UNSPECIFIED FRACTURE MORPHOLOGY, SUBSEQUENT ENCOUNTER: ICD-10-CM

## 2021-12-01 PROCEDURE — 72110 X-RAY EXAM L-2 SPINE 4/>VWS: CPT

## 2021-12-01 PROCEDURE — 72050 X-RAY EXAM NECK SPINE 4/5VWS: CPT

## 2021-12-06 ENCOUNTER — OFFICE VISIT (OUTPATIENT)
Dept: NEUROSURGERY | Facility: CLINIC | Age: 56
End: 2021-12-06
Payer: COMMERCIAL

## 2021-12-06 VITALS
WEIGHT: 195 LBS | TEMPERATURE: 98.1 F | HEIGHT: 70 IN | DIASTOLIC BLOOD PRESSURE: 84 MMHG | HEART RATE: 78 BPM | BODY MASS INDEX: 27.92 KG/M2 | SYSTOLIC BLOOD PRESSURE: 116 MMHG

## 2021-12-06 DIAGNOSIS — S22.080D COMPRESSION FRACTURE OF T12 VERTEBRA WITH ROUTINE HEALING: ICD-10-CM

## 2021-12-06 DIAGNOSIS — S12.501D CLOSED NONDISPLACED FRACTURE OF SIXTH CERVICAL VERTEBRA WITH ROUTINE HEALING, UNSPECIFIED FRACTURE MORPHOLOGY, SUBSEQUENT ENCOUNTER: Primary | ICD-10-CM

## 2021-12-06 PROCEDURE — 99213 OFFICE O/P EST LOW 20 MIN: CPT | Performed by: PHYSICIAN ASSISTANT

## 2021-12-23 ENCOUNTER — EVALUATION (OUTPATIENT)
Dept: PHYSICAL THERAPY | Facility: REHABILITATION | Age: 56
End: 2021-12-23
Payer: COMMERCIAL

## 2021-12-23 DIAGNOSIS — M54.6 ACUTE THORACIC BACK PAIN, UNSPECIFIED BACK PAIN LATERALITY: ICD-10-CM

## 2021-12-23 DIAGNOSIS — S12.501D CLOSED NONDISPLACED FRACTURE OF SIXTH CERVICAL VERTEBRA WITH ROUTINE HEALING, UNSPECIFIED FRACTURE MORPHOLOGY, SUBSEQUENT ENCOUNTER: ICD-10-CM

## 2021-12-23 DIAGNOSIS — M54.2 NECK PAIN: Primary | ICD-10-CM

## 2021-12-23 DIAGNOSIS — S22.080D COMPRESSION FRACTURE OF T12 VERTEBRA WITH ROUTINE HEALING, SUBSEQUENT ENCOUNTER: ICD-10-CM

## 2021-12-23 PROCEDURE — 97110 THERAPEUTIC EXERCISES: CPT | Performed by: PHYSICAL THERAPIST

## 2021-12-23 PROCEDURE — 97161 PT EVAL LOW COMPLEX 20 MIN: CPT | Performed by: PHYSICAL THERAPIST

## 2021-12-27 ENCOUNTER — OFFICE VISIT (OUTPATIENT)
Dept: PHYSICAL THERAPY | Facility: REHABILITATION | Age: 56
End: 2021-12-27
Payer: COMMERCIAL

## 2021-12-27 DIAGNOSIS — M54.6 ACUTE THORACIC BACK PAIN, UNSPECIFIED BACK PAIN LATERALITY: ICD-10-CM

## 2021-12-27 DIAGNOSIS — S12.501D CLOSED NONDISPLACED FRACTURE OF SIXTH CERVICAL VERTEBRA WITH ROUTINE HEALING, UNSPECIFIED FRACTURE MORPHOLOGY, SUBSEQUENT ENCOUNTER: ICD-10-CM

## 2021-12-27 DIAGNOSIS — M54.2 NECK PAIN: Primary | ICD-10-CM

## 2021-12-27 DIAGNOSIS — S22.080D COMPRESSION FRACTURE OF T12 VERTEBRA WITH ROUTINE HEALING, SUBSEQUENT ENCOUNTER: ICD-10-CM

## 2021-12-27 PROCEDURE — 97112 NEUROMUSCULAR REEDUCATION: CPT | Performed by: PHYSICAL THERAPIST

## 2021-12-27 PROCEDURE — 97110 THERAPEUTIC EXERCISES: CPT | Performed by: PHYSICAL THERAPIST

## 2022-01-03 ENCOUNTER — OFFICE VISIT (OUTPATIENT)
Dept: PHYSICAL THERAPY | Facility: REHABILITATION | Age: 57
End: 2022-01-03
Payer: COMMERCIAL

## 2022-01-03 DIAGNOSIS — S22.080D COMPRESSION FRACTURE OF T12 VERTEBRA WITH ROUTINE HEALING, SUBSEQUENT ENCOUNTER: ICD-10-CM

## 2022-01-03 DIAGNOSIS — S12.501D CLOSED NONDISPLACED FRACTURE OF SIXTH CERVICAL VERTEBRA WITH ROUTINE HEALING, UNSPECIFIED FRACTURE MORPHOLOGY, SUBSEQUENT ENCOUNTER: ICD-10-CM

## 2022-01-03 DIAGNOSIS — M54.6 ACUTE THORACIC BACK PAIN, UNSPECIFIED BACK PAIN LATERALITY: ICD-10-CM

## 2022-01-03 DIAGNOSIS — M54.2 NECK PAIN: Primary | ICD-10-CM

## 2022-01-03 PROCEDURE — 97140 MANUAL THERAPY 1/> REGIONS: CPT | Performed by: PHYSICAL THERAPIST

## 2022-01-03 PROCEDURE — 97110 THERAPEUTIC EXERCISES: CPT | Performed by: PHYSICAL THERAPIST

## 2022-01-03 NOTE — PROGRESS NOTES
Daily Note     Today's date: 1/3/2022  Patient name: Maria Antonia Mckinnon  : 1965  MRN: [id number]  Referring provider: Reva Booker PA-C  Dx:   Encounter Diagnosis     ICD-10-CM    1  Neck pain  M54 2    2  Closed nondisplaced fracture of sixth cervical vertebra with routine healing, unspecified fracture morphology, subsequent encounter  S12 501D    3  Acute thoracic back pain, unspecified back pain laterality  M54 6    4  Compression fracture of T12 vertebra with routine healing, subsequent encounter  S22 080D        Start Time:   Stop Time:   Total time in clinic (min): 41 minutes    Subjective: Patient has had neck pain and an associated headache since Saturday  He has been taking Excedrin as needed  He admits he has not been doing his home exercises often      Objective: See treatment diary below      Assessment: Treatment emphasized manual therapy including suboccipital release directed at reducing headache symptoms with decreased pain immediately following treatment  Completed moist heat post-treatment for pain relief  Educated patient regarding importance of consistent completion of home exercises in order to progress functionally with interventions  Plan: Continue per plan of care        Diagnosis: C6 fracture and T12 compression fracture   Precautions: weaning from cervical collar and TLSO    Primary impairments: cervical mobility deficits, postural dysfunction, and postural strength deficits   *asterisks by exercise = given for HEP    12/23 12/27 1/3     Manuals        B UT IASTM        Thoracic ext MWM        Thoracic rot MWM        SOR    23'             There Ex        UBE   back x 8'  back x 8'     Cerv rot AROM against wall *  x 10  x 10      Cerv flex/ext AROM against wall *  x 10  x 10      Open book stretch *   20" x 3 B      LTR on ball   x 15      B/L UT stretch on mat   20" x 3                              Neuro Re-Ed        Seated scap retractions *  5" x 10  5" x 10      Band rows   red 2 x 10      Band extensions   red 2 x 10      Band no moneys        Band H  abd        Supine chin tucks        Prone T, Y, I on ball                                                Re-evaluation             Ther Act/Gait                                         Modalities             MHP prn      10'

## 2022-01-06 ENCOUNTER — OFFICE VISIT (OUTPATIENT)
Dept: PHYSICAL THERAPY | Facility: REHABILITATION | Age: 57
End: 2022-01-06
Payer: COMMERCIAL

## 2022-01-06 DIAGNOSIS — M54.6 ACUTE THORACIC BACK PAIN, UNSPECIFIED BACK PAIN LATERALITY: ICD-10-CM

## 2022-01-06 DIAGNOSIS — S12.501D CLOSED NONDISPLACED FRACTURE OF SIXTH CERVICAL VERTEBRA WITH ROUTINE HEALING, UNSPECIFIED FRACTURE MORPHOLOGY, SUBSEQUENT ENCOUNTER: ICD-10-CM

## 2022-01-06 DIAGNOSIS — S22.080D COMPRESSION FRACTURE OF T12 VERTEBRA WITH ROUTINE HEALING, SUBSEQUENT ENCOUNTER: ICD-10-CM

## 2022-01-06 DIAGNOSIS — M54.2 NECK PAIN: Primary | ICD-10-CM

## 2022-01-06 PROCEDURE — 97140 MANUAL THERAPY 1/> REGIONS: CPT | Performed by: PHYSICAL THERAPIST

## 2022-01-06 PROCEDURE — 97110 THERAPEUTIC EXERCISES: CPT | Performed by: PHYSICAL THERAPIST

## 2022-01-06 PROCEDURE — 97112 NEUROMUSCULAR REEDUCATION: CPT | Performed by: PHYSICAL THERAPIST

## 2022-01-06 NOTE — PROGRESS NOTES
Daily Note     Today's date: 2022  Patient name: Kathy Ledbetter  : 1965  MRN: [id number]  Referring provider: Cong Temple PA-C  Dx:   Encounter Diagnosis     ICD-10-CM    1  Neck pain  M54 2    2  Closed nondisplaced fracture of sixth cervical vertebra with routine healing, unspecified fracture morphology, subsequent encounter  S12 501D    3  Acute thoracic back pain, unspecified back pain laterality  M54 6    4  Compression fracture of T12 vertebra with routine healing, subsequent encounter  S22 080D        Start Time: 1615  Stop Time: 1700  Total time in clinic (min): 45 minutes    Subjective: Patient reports feeling much better after last visit with headache and pain eliminated post-treatment      Objective: See treatment diary below      Assessment: Tolerated treatment well including postural stability progressions with band resistance  Patient responded well to thoracic extension and rotation mobilizations with movement with increased mobility and decreased reported stiffness following manual therapy  Patient demonstrated fatigue post treatment  Plan: Continue per plan of care        Diagnosis: C6 fracture and T12 compression fracture   Precautions: weaning from cervical collar and TLSO    Primary impairments: cervical mobility deficits, postural dysfunction, and postural strength deficits   *asterisks by exercise = given for HEP    12/23 12/27 1/3 1/6    Manuals        B UT IASTM        Thoracic ext MWM     5'    Thoracic rot MWM     5'    SOR    23'  5'            There Ex        UBE   back x 8'  back x 8'  back x 8'    Cerv rot AROM against wall *  x 10  x 10   HEP    Cerv flex/ext AROM against wall *  x 10  x 10   HEP    Open book stretch *   20" x 3 B   HEP    LTR on ball   x 15   x 10    B/L UT stretch on mat   20" x 3   20" x 3                            Neuro Re-Ed        Seated scap retractions *  5" x 10  5" x 10      Band rows   red 2 x 10   green 2 x 10    Band extensions   red 2 x 10   green 2 x 10    Band no moneys     red 2 x 10    Band H  abd     red 2 x 10    Supine chin tucks     5" x 10    Prone T, Y, I on ball                                                Re-evaluation             Ther Act/Gait                                         Modalities             P prn      10'

## 2022-01-10 ENCOUNTER — OFFICE VISIT (OUTPATIENT)
Dept: PHYSICAL THERAPY | Facility: REHABILITATION | Age: 57
End: 2022-01-10
Payer: COMMERCIAL

## 2022-01-10 DIAGNOSIS — S22.080D COMPRESSION FRACTURE OF T12 VERTEBRA WITH ROUTINE HEALING, SUBSEQUENT ENCOUNTER: ICD-10-CM

## 2022-01-10 DIAGNOSIS — M54.2 NECK PAIN: Primary | ICD-10-CM

## 2022-01-10 DIAGNOSIS — S12.501D CLOSED NONDISPLACED FRACTURE OF SIXTH CERVICAL VERTEBRA WITH ROUTINE HEALING, UNSPECIFIED FRACTURE MORPHOLOGY, SUBSEQUENT ENCOUNTER: ICD-10-CM

## 2022-01-10 DIAGNOSIS — M54.6 ACUTE THORACIC BACK PAIN, UNSPECIFIED BACK PAIN LATERALITY: ICD-10-CM

## 2022-01-10 PROCEDURE — 97110 THERAPEUTIC EXERCISES: CPT | Performed by: PHYSICAL THERAPIST

## 2022-01-10 PROCEDURE — 97112 NEUROMUSCULAR REEDUCATION: CPT | Performed by: PHYSICAL THERAPIST

## 2022-01-10 PROCEDURE — 97140 MANUAL THERAPY 1/> REGIONS: CPT | Performed by: PHYSICAL THERAPIST

## 2022-01-10 NOTE — PROGRESS NOTES
Daily Note     Today's date: 1/10/2022  Patient name: Nazanin Andrade  : 1965  MRN: [id number]  Referring provider: Barron Mcmillan PA-C  Dx:   Encounter Diagnosis     ICD-10-CM    1  Neck pain  M54 2    2  Closed nondisplaced fracture of sixth cervical vertebra with routine healing, unspecified fracture morphology, subsequent encounter  S12 501D    3  Acute thoracic back pain, unspecified back pain laterality  M54 6    4  Compression fracture of T12 vertebra with routine healing, subsequent encounter  S22 080D        Start Time: 1600  Stop Time: 1650  Total time in clinic (min): 50 minutes    Subjective: Patient felt fine after his last visit however he had some muscle tightness and headaches over the weekend with no known cause  Patient is concerned regarding return to work for financial reasons      Objective: See treatment diary below      Assessment: Patient tolerated treatment well and responded very well to thoracic rotation mobilization with movement with improved mobility immediately following manual therapy  Patient exhibited good technique with therapeutic exercises and would benefit from continued PT  Progressed band resisted postural strengthening with muscle fatigue noted post-treatment  Plan: Continue per plan of care        Diagnosis: C6 fracture and T12 compression fracture   Precautions: weaning from cervical collar and TLSO    Primary impairments: cervical mobility deficits, postural dysfunction, and postural strength deficits   *asterisks by exercise = given for HEP    12/23 12/27 1/3 1/6 1/10   Manuals        B UT IASTM        Thoracic ext MWM     5'  2'   Thoracic rot MWM     5'  2'   SOR    23'  5'  6'           There Ex        UBE   back x 8'  back x 8'  back x 8'  back x 8'   Cerv rot AROM against wall *  x 10  x 10   HEP    Cerv flex/ext AROM against wall *  x 10  x 10   HEP    Open book stretch *   20" x 3 B   HEP    LTR on ball   x 15   x 10    B/L UT stretch on mat   20" x 3 20" x 3                            Neuro Re-Ed        Seated scap retractions *  5" x 10  5" x 10      Band rows   red 2 x 10   green 2 x 10  green 3 x 10   Band extensions   red 2 x 10   green 2 x 10  green 3 x 10   Band no moneys     red 2 x 10  red 3 x 10   Band H  abd     red 2 x 10  red 3 x 10   Supine chin tucks     5" x 10    Prone T, Y, I on ball                                                Re-evaluation             Ther Act/Gait                                         Modalities             P prn      10'   10'

## 2022-01-11 ENCOUNTER — DOCUMENTATION (OUTPATIENT)
Dept: NEUROSURGERY | Facility: CLINIC | Age: 57
End: 2022-01-11

## 2022-01-11 NOTE — PROGRESS NOTES
Contacted by Wallace Bradley, PT  Patient Km Sena was seen in our office for a C6 and T12 fracture  The patient was cleared on 12/6/21 from our standpoint to wean out of the collar and brace with no further follow up needed from our office  Understand that the patient does not have the funds to undergo FCE as recommended for clearance to go back to work  Explained to Mr Jordan Swift that the patient would have to follow up with his PCP for clearence  We did not originally take him out of work  His options are to undergo a FCE or he can speak to Dr Ehsan Lala to hopefully work something out  It is not appropriate for this office to document that he can return to work because we did not take him out of work

## 2022-01-12 ENCOUNTER — APPOINTMENT (OUTPATIENT)
Dept: PHYSICAL THERAPY | Facility: REHABILITATION | Age: 57
End: 2022-01-12
Payer: COMMERCIAL

## 2022-01-17 ENCOUNTER — APPOINTMENT (OUTPATIENT)
Dept: PHYSICAL THERAPY | Facility: REHABILITATION | Age: 57
End: 2022-01-17
Payer: COMMERCIAL

## 2022-01-20 ENCOUNTER — OFFICE VISIT (OUTPATIENT)
Dept: PHYSICAL THERAPY | Facility: REHABILITATION | Age: 57
End: 2022-01-20
Payer: COMMERCIAL

## 2022-01-20 DIAGNOSIS — S12.501D CLOSED NONDISPLACED FRACTURE OF SIXTH CERVICAL VERTEBRA WITH ROUTINE HEALING, UNSPECIFIED FRACTURE MORPHOLOGY, SUBSEQUENT ENCOUNTER: ICD-10-CM

## 2022-01-20 DIAGNOSIS — M54.2 NECK PAIN: Primary | ICD-10-CM

## 2022-01-20 DIAGNOSIS — S22.080D COMPRESSION FRACTURE OF T12 VERTEBRA WITH ROUTINE HEALING, SUBSEQUENT ENCOUNTER: ICD-10-CM

## 2022-01-20 DIAGNOSIS — M54.6 ACUTE THORACIC BACK PAIN, UNSPECIFIED BACK PAIN LATERALITY: ICD-10-CM

## 2022-01-20 PROCEDURE — 97112 NEUROMUSCULAR REEDUCATION: CPT | Performed by: PHYSICAL THERAPIST

## 2022-01-20 PROCEDURE — 97110 THERAPEUTIC EXERCISES: CPT | Performed by: PHYSICAL THERAPIST

## 2022-01-20 PROCEDURE — 97140 MANUAL THERAPY 1/> REGIONS: CPT | Performed by: PHYSICAL THERAPIST

## 2022-01-20 NOTE — PROGRESS NOTES
Daily Note     Today's date: 2022  Patient name: Pablo Chin  : 1965  MRN: [id number]  Referring provider: Mary Jane Denny PA-C  Dx:   Encounter Diagnosis     ICD-10-CM    1  Neck pain  M54 2    2  Closed nondisplaced fracture of sixth cervical vertebra with routine healing, unspecified fracture morphology, subsequent encounter  S12 501D    3  Acute thoracic back pain, unspecified back pain laterality  M54 6    4  Compression fracture of T12 vertebra with routine healing, subsequent encounter  S22 080D                   Subjective: Patient states increased pain attributed to shoveling snow earlier in the week  Objective: See treatment diary below      Assessment: Patient performed exercises without c/o pain; stated decreased stiffness after completion of IASTM  Plan: Continue per plan of care        Diagnosis: C6 fracture and T12 compression fracture   Precautions: weaning from cervical collar and TLSO    Primary impairments: cervical mobility deficits, postural dysfunction, and postural strength deficits   *asterisks by exercise = given for HEP    1/20 12/27 1/3 1/6 1/10   Manuals        B UT IASTM  KK       Thoracic ext MWM     5'  2'   Thoracic rot MWM     5'  2'   SOR KK   23'  5'  6'           There Ex        UBE back x 8'  back x 8'  back x 8'  back x 8'  back x 8'   Cerv rot AROM against wall *     HEP    Cerv flex/ext AROM against wall *     HEP    Open book stretch *   20" x 3 B   HEP    LTR on ball   x 15   x 10    B/L UT stretch on mat   20" x 3   20" x 3                            Neuro Re-Ed        Seated scap retractions *    5" x 10      Band rows  green 3 x 10  red 2 x 10   green 2 x 10  green 3 x 10   Band extensions  green 3 x 10  red 2 x 10   green 2 x 10  green 3 x 10   Band no moneys  red 3x10    red 2 x 10  red 3 x 10   Band H  abd red 3x10    red 2 x 10  red 3 x 10   Supine chin tucks     5" x 10    Prone T, Y, I on ball Re-evaluation             Ther Act/Gait                                         Modalities             MHP prn      10'   10'

## 2022-01-26 ENCOUNTER — OFFICE VISIT (OUTPATIENT)
Dept: PHYSICAL THERAPY | Facility: REHABILITATION | Age: 57
End: 2022-01-26
Payer: COMMERCIAL

## 2022-01-26 DIAGNOSIS — S12.501D CLOSED NONDISPLACED FRACTURE OF SIXTH CERVICAL VERTEBRA WITH ROUTINE HEALING, UNSPECIFIED FRACTURE MORPHOLOGY, SUBSEQUENT ENCOUNTER: ICD-10-CM

## 2022-01-26 DIAGNOSIS — M54.2 NECK PAIN: Primary | ICD-10-CM

## 2022-01-26 DIAGNOSIS — S22.080D COMPRESSION FRACTURE OF T12 VERTEBRA WITH ROUTINE HEALING, SUBSEQUENT ENCOUNTER: ICD-10-CM

## 2022-01-26 DIAGNOSIS — M54.6 ACUTE THORACIC BACK PAIN, UNSPECIFIED BACK PAIN LATERALITY: ICD-10-CM

## 2022-01-26 PROCEDURE — 97112 NEUROMUSCULAR REEDUCATION: CPT | Performed by: PHYSICAL THERAPIST

## 2022-01-26 PROCEDURE — 97140 MANUAL THERAPY 1/> REGIONS: CPT | Performed by: PHYSICAL THERAPIST

## 2022-01-26 NOTE — PROGRESS NOTES
Daily Note     Today's date: 2022  Patient name: Jyoti Michaels  : 1965  MRN: [id number]  Referring provider: Amandeep Hunter PA-C  Dx:   Encounter Diagnosis     ICD-10-CM    1  Neck pain  M54 2    2  Closed nondisplaced fracture of sixth cervical vertebra with routine healing, unspecified fracture morphology, subsequent encounter  S12 501D    3  Acute thoracic back pain, unspecified back pain laterality  M54 6    4  Compression fracture of T12 vertebra with routine healing, subsequent encounter  S22 080D        Start Time: 1630  Stop Time: 1700  Total time in clinic (min): 30 minutes    Subjective: Patient arrived to appointment late due to having appointment time confused  He has a sensation of fluid moving in his neck      Objective: See treatment diary below      Assessment: Treatment consisted of manual therapy directed at reducing cervical stiffness and improving mobility  Discussed return to work at length with patient as this is his main concern  Educated patient regarding functional capacity evaluation and recommended he call physician office that has been following him since injury to discuss return to work timing and testing  Plan to progress functional strengthening next visit  Plan: Continue per plan of care        Diagnosis: C6 fracture and T12 compression fracture   Precautions: weaning from cervical collar and TLSO    Primary impairments: cervical mobility deficits, postural dysfunction, and postural strength deficits   *asterisks by exercise = given for HEP    1/20 1/26 1/3 1/6 1/10   Manuals        B UT IASTM  KK       Thoracic ext MWM     5'  2'   Thoracic rot MWM     5'  2'   SOR KK  8'  23'  5'  6'   Cervical paraspinal STM   10'              There Ex        UBE back x 8'   back x 8'  back x 8'  back x 8'   Cerv rot AROM against wall *     HEP    Cerv flex/ext AROM against wall *     HEP    Open book stretch *     HEP    LTR on ball     x 10    B/L UT stretch on mat     20" x 3 Neuro Re-Ed        Seated scap retractions *         Band rows *  green 3 x 10    green 2 x 10  green 3 x 10   Band extensions *  green 3 x 10    green 2 x 10  green 3 x 10   Band no moneys  red 3x10    red 2 x 10  red 3 x 10   Band H  abd red 3x10    red 2 x 10  red 3 x 10   Supine chin tucks     5" x 10    Prone T, Y, I on ball                                        Patient education   CM      Re-evaluation            Ther Act/Gait                                      Modalities            MHP prn      10'   10'

## 2022-02-02 ENCOUNTER — EVALUATION (OUTPATIENT)
Dept: PHYSICAL THERAPY | Facility: REHABILITATION | Age: 57
End: 2022-02-02
Payer: COMMERCIAL

## 2022-02-02 DIAGNOSIS — S22.080D COMPRESSION FRACTURE OF T12 VERTEBRA WITH ROUTINE HEALING, SUBSEQUENT ENCOUNTER: ICD-10-CM

## 2022-02-02 DIAGNOSIS — S12.501D CLOSED NONDISPLACED FRACTURE OF SIXTH CERVICAL VERTEBRA WITH ROUTINE HEALING, UNSPECIFIED FRACTURE MORPHOLOGY, SUBSEQUENT ENCOUNTER: ICD-10-CM

## 2022-02-02 DIAGNOSIS — M54.2 NECK PAIN: Primary | ICD-10-CM

## 2022-02-02 DIAGNOSIS — M54.6 ACUTE THORACIC BACK PAIN, UNSPECIFIED BACK PAIN LATERALITY: ICD-10-CM

## 2022-02-02 PROCEDURE — 97112 NEUROMUSCULAR REEDUCATION: CPT | Performed by: PHYSICAL THERAPIST

## 2022-02-02 NOTE — PROGRESS NOTES
PT Re-Evaluation     Today's date: 2022  Patient name: Katiana Contreras  : 1965  MRN: [id number]  Referring provider: Gabriele Clayton PA-C  Dx:   Encounter Diagnosis     ICD-10-CM    1  Neck pain  M54 2    2  Closed nondisplaced fracture of sixth cervical vertebra with routine healing, unspecified fracture morphology, subsequent encounter  S12 501D    3  Acute thoracic back pain, unspecified back pain laterality  M54 6    4  Compression fracture of T12 vertebra with routine healing, subsequent encounter  S22 080D        Start Time: 1615  Stop Time: 1700  Total time in clinic (min): 45 minutes    Assessment  Assessment details: Patient is a 64 y o  male presenting to reexamination following C6 fracture and T12 compression fracture related to a fall from tree with date of injury 21  Patient exhibits good progress toward objective and functional goals at time of reexamination  Patient exhibits improvements with cervical mobility in all planes most notably rotation, upper quarter strength, and tolerance to occupational requirements on light duty since initiating PT however continues to have limitations compared to prior level of function  Patient is no longer utilizing cervical collar or TLSO and exhibits improved postural stability with improved tolerance to time spent out of brace  Remaining functional limitations include inability to return to unrestricted work, inability to golf, and difficulty at end-range cervical rotation  Patient achieved FOTO score of 59 indicating a 7 point improvement since prior assessment  As a result of impairments patient has continued limitations with daily and functional activities and would benefit from continued skilled PT interventions to address these impairments in order to maximize function   Plan to progress toward advanced core/postural strengthening and work simulation as appropriate in order to prepare patient for return to work without restrictions  Impairments: abnormal muscle firing, abnormal or restricted ROM, abnormal movement, activity intolerance, impaired physical strength and pain with function     Prognosis: good    Goals  Impairment Goals: 4-6 weeks  - Patient to improve cervical rotation by 10 degrees B/L - MET  - Patient to improve seated posture to [facility] - PROGRESSING  - Patient to improve range of vision rotating head while driving - MET    Functional Goals: by discharge  - Patient to discharge to independent [facility] - PROGRESSING  - Patient to increase FOTO to 77 - PROGRESSING  - Patient to return to work without restrictions - PROGRESSING  - Patient to return to golfing with minimal limitations - PROGRESSING  - Patient to deny limitations with head rotation while driving - [address]  Patient would benefit from: skilled physical therapy  Planned modality interventions: cryotherapy, TENS and thermotherapy: hydrocollator packs  Planned therapy interventions: flexibility, home exercise program, joint mobilization, manual therapy, neuromuscular re-education, patient education, strengthening, stretching, therapeutic activities, therapeutic exercise and functional ROM exercises  Frequency: 1x week  Duration in weeks: 6  Treatment plan discussed with: patient        Subjective Evaluation    History of Present Illness  Mechanism of injury: HISTORY OF PRESENT ILLNESS: Patient feels he is about the same since starting PT  Patient is no longer using cervical collar for sleeping or TLSO for driving  Patient is on light duty work with 10 lb  lifting restriction and no BLT  No new sensory or motor changes  He continues to have cervical pain and occasional headaches    PRIOR TREATMENT: cervical collar and TLSO  AGGRAVATING FACTORS: cervical rotation       EASING FACTORS: N/A  WORK:  (light duty)  IMAGING: see chart  FUNCTIONAL LIMITATIONS: light duty work, cervical rotation, driving, golfing  IMPROVEMENTS: improved tolerance to light duty work, improved ability to maintain upright posture out of brace, cervical mobility  SUBJECTIVE FUNCTIONAL LEVEL: 70% improvement since PT  PATIENT GOAL: strengthen my core    Pain  Current pain rating: 3  At best pain ratin  At worst pain ratin  Location: Cervical spine          Objective     Palpation     Additional Palpation Details  No TTP spinous processes  Upper trapezius flexibility limitations B/L  Levator scapulae flexibility limitations B/L    Neurological Testing     Sensation   Cervical/Thoracic   Left   Intact: light touch    Right   Intact: light touch    Lumbar   Left   Intact: light touch    Right   Intact: light touch    Reflexes   Left   Biceps (C5/C6): normal (2+)  Brachioradialis (C6): absent (0)  Triceps (C7): absent (0)  Patellar (L4): normal (2+)  Achilles (S1): absent (0)  Goss's reflex: negative    Right   Biceps (C5/C6): normal (2+)  Brachioradialis (C6): absent (0)  Triceps (C7): absent (0)  Patellar (L4): normal (2+)  Achilles (S1): absent (0)  Goss's reflex: negative    Active Range of Motion   Cervical/Thoracic Spine       Cervical    Flexion: 60 degrees   Extension: 48 degrees      Left lateral flexion:  Restriction level: moderate  Right lateral flexion:  with pain Restriction level moderate  Left rotation: 50 degrees  Right rotation: 60 degrees           Lumbar   Flexion:  WFL  Extension:  Restriction level: minimal  Left lateral flexion:  Restriction level: minimal  Right lateral flexion:  Restriction level: minimal  Left rotation:  Restriction level: minimal  Right rotation:  [facility]    Strength/Myotome Testing     Left Hip   Planes of Motion   Flexion: 4+    Right Hip   Planes of Motion   Flexion: 4+    Left Knee   Extension: 5    Right Knee   Extension: 5    Left Ankle/Foot   Dorsiflexion: 5  Eversion: 5  Great toe extension: 5    Right Ankle/Foot   Dorsiflexion: 5  Eversion: 5  Great toe extension: 5    Additional Strength Details  Cervical myotomal strength 5/5 bilaterally with no focal deficits  No focal lumbar myotomal strength deficits      Flowsheet Rows      Most Recent Value   PT/OT G-Codes    Current Score 59   Projected Score 66             Diagnosis: C6 fracture and T12 compression fracture   Precautions: weaning from cervical collar and TLSO    Primary impairments: cervical mobility deficits, postural dysfunction, and postural strength deficits   *asterisks by exercise = given for HEP    1/20 1/26 2/2 1/6 1/10   Manuals        B UT IASTM  KK       Thoracic ext MWM     5'  2'   Thoracic rot MWM     5'  2'   SOR KK  8'   5'  6'   Cervical paraspinal STM   10'              There Ex        UBE back x 8'   fwd/back x 6' total  back x 8'  back x 8'   Cerv rot AROM against wall *     HEP    Cerv flex/ext AROM against wall *     HEP    Open book stretch *     HEP    LTR on ball     x 10    B/L UT stretch on mat     20" x 3                            Neuro Re-Ed        Seated scap retractions *         Band rows *  green 3 x 10    green 2 x 10  green 3 x 10   Band extensions *  green 3 x 10    green 2 x 10  green 3 x 10   Band no moneys  red 3x10    red 2 x 10  red 3 x 10   Band H  abd red 3x10    red 2 x 10  red 3 x 10   Supine chin tucks     5" x 10    Prone T, Y, I on ball        Quadruped bird dogs        Prone plank on knees                                                Patient education   CM      Re-evaluation     CM       Ther Act/Gait            Work simulation                       Modalities           MHP prn        10'

## 2022-02-09 ENCOUNTER — APPOINTMENT (OUTPATIENT)
Dept: PHYSICAL THERAPY | Facility: REHABILITATION | Age: 57
End: 2022-02-09
Payer: COMMERCIAL

## 2022-02-17 ENCOUNTER — OFFICE VISIT (OUTPATIENT)
Dept: PHYSICAL THERAPY | Facility: REHABILITATION | Age: 57
End: 2022-02-17
Payer: COMMERCIAL

## 2022-02-17 DIAGNOSIS — S12.501D CLOSED NONDISPLACED FRACTURE OF SIXTH CERVICAL VERTEBRA WITH ROUTINE HEALING, UNSPECIFIED FRACTURE MORPHOLOGY, SUBSEQUENT ENCOUNTER: Primary | ICD-10-CM

## 2022-02-17 DIAGNOSIS — S22.080D COMPRESSION FRACTURE OF T12 VERTEBRA WITH ROUTINE HEALING, SUBSEQUENT ENCOUNTER: ICD-10-CM

## 2022-02-17 PROCEDURE — 97110 THERAPEUTIC EXERCISES: CPT | Performed by: PHYSICAL THERAPIST

## 2022-02-17 PROCEDURE — 97530 THERAPEUTIC ACTIVITIES: CPT | Performed by: PHYSICAL THERAPIST

## 2022-02-17 NOTE — PROGRESS NOTES
Daily Note     Today's date: 2022  Patient name: Praveen Ariza  : 1965  MRN: [id number]  Referring provider: Khai Dupree PA-C  Dx:   Encounter Diagnosis     ICD-10-CM    1  Closed nondisplaced fracture of sixth cervical vertebra with routine healing, unspecified fracture morphology, subsequent encounter  S12 501D    2  Compression fracture of T12 vertebra with routine healing, subsequent encounter  S22 080D        Start Time: 1530  Stop Time: 1615  Total time in clinic (min): 45 minutes    Subjective: Patient expresses frustration regarding his inability to return to work and obtain a clearance  He was referred to physiatry by his neurosurgeon      Objective: See treatment diary below      Assessment: Treatment emphasized work simulation including functional lifting, stair climbing, and pushing/pulling  Tolerated treatment well with no adverse responses or pain aggravation during functional lifting tasks or other work simulation  Patient able to lift 30 lbs  repetitively with appropriate body mechanics  Plan to continue to emphasize work simulation in order to prepare patient for unrestricted return to work  Patient would benefit from continued PT  Plan: Continue per plan of care        Diagnosis: C6 fracture and T12 compression fracture   Precautions: weaning from cervical collar and TLSO    Primary impairments: cervical mobility deficits, postural dysfunction, and postural strength deficits   *asterisks by exercise = given for HEP    1/20 1/26 2/2 2/17 1/10   Manuals        B UT IASTM  KK       Thoracic ext MWM      2'   Thoracic rot MWM      2'   SOR KK  8'    6'   Cervical paraspinal STM   10'              There Ex        UBE back x 8'   fwd/back x 6' total  fwd/back x 5' total  back x 8'   Cerv rot AROM against wall *        Cerv flex/ext AROM against wall *        Open book stretch *        LTR on ball        B/L UT stretch on mat        Band golf swing     green x 10 Neuro Re-Ed        Seated scap retractions *         Band rows *  green 3 x 10     green 3 x 10   Band extensions *  green 3 x 10     green 3 x 10   Band no moneys  red 3x10     red 3 x 10   Band H  abd red 3x10     red 3 x 10   Supine chin tucks        Prone T, Y, I on ball        Quadruped bird dogs        Prone plank on knees                                                Patient education   CM      Re-evaluation     CM      Ther Act/Gait          Box lift: floor to waist and floor to mat     30 lbs x 20 each     Pushing/pulling     mini trampoline x 10 laps    Stair navigation     2 flights x 10                               Modalities          MHP prn        10'

## 2022-02-23 ENCOUNTER — OFFICE VISIT (OUTPATIENT)
Dept: PHYSICAL THERAPY | Facility: REHABILITATION | Age: 57
End: 2022-02-23
Payer: COMMERCIAL

## 2022-02-23 DIAGNOSIS — M54.6 ACUTE THORACIC BACK PAIN, UNSPECIFIED BACK PAIN LATERALITY: ICD-10-CM

## 2022-02-23 DIAGNOSIS — S12.501D CLOSED NONDISPLACED FRACTURE OF SIXTH CERVICAL VERTEBRA WITH ROUTINE HEALING, UNSPECIFIED FRACTURE MORPHOLOGY, SUBSEQUENT ENCOUNTER: Primary | ICD-10-CM

## 2022-02-23 DIAGNOSIS — S22.080D COMPRESSION FRACTURE OF T12 VERTEBRA WITH ROUTINE HEALING: ICD-10-CM

## 2022-02-23 DIAGNOSIS — M54.2 NECK PAIN: ICD-10-CM

## 2022-02-23 DIAGNOSIS — S22.080D COMPRESSION FRACTURE OF T12 VERTEBRA WITH ROUTINE HEALING, SUBSEQUENT ENCOUNTER: ICD-10-CM

## 2022-02-23 PROCEDURE — 97530 THERAPEUTIC ACTIVITIES: CPT | Performed by: PHYSICAL THERAPIST

## 2022-02-23 PROCEDURE — 97110 THERAPEUTIC EXERCISES: CPT | Performed by: PHYSICAL THERAPIST

## 2022-02-23 NOTE — PROGRESS NOTES
Daily Note     Today's date: 2022  Patient name: Katrin Raymundo  : 1965  MRN: [id number]  Referring provider: Mary Galeas PA-C  Dx:   Encounter Diagnosis     ICD-10-CM    1  Closed nondisplaced fracture of sixth cervical vertebra with routine healing, unspecified fracture morphology, subsequent encounter  S12 501D Ambulatory referral to pt/ot functional capacity evaluation   2  Neck pain  M54 2    3  Compression fracture of T12 vertebra with routine healing, subsequent encounter  S22 080D    4  Acute thoracic back pain, unspecified back pain laterality  M54 6    5  Compression fracture of T12 vertebra with routine healing  S22 080D Ambulatory referral to pt/ot functional capacity evaluation       Start Time: 2613  Stop Time: 1700  Total time in clinic (min): 45 minutes    Subjective: Patient tolerated last treatment well with some soreness in his legs but nothing in his neck or back  Patient has appointment to discuss return to work with physician 3/9      Objective: See treatment diary below      Assessment: Tolerated treatment well with continued emphasis on work simulation  No adverse responses to lifting, pushing, or pulling throughout treatment and no reports of pain aggravation  Initiated forward step ups on high step to simulate step up into truck  Patient exhibited good technique with therapeutic exercises and would benefit from continued PT in order to facilitate unrestricted return to work  Plan: Continue per plan of care        Diagnosis: C6 fracture and T12 compression fracture   Precautions: weaning from cervical collar and TLSO    Primary impairments: cervical mobility deficits, postural dysfunction, and postural strength deficits   *asterisks by exercise = given for HEP       Manuals        B UT IASTM  KK       Thoracic ext MWM        Thoracic rot MWM        SOR KK  8'      Cervical paraspinal STM   10'              There Ex        Elliptical      L3 x 8' UBE back x 8'   fwd/back x 6' total  fwd/back x 5' total    Cerv rot AROM against wall *        Cerv flex/ext AROM against wall *        Open book stretch *        LTR on ball        B/L UT stretch on mat        Band golf swing     green x 10                    Neuro Re-Ed        Seated scap retractions *         Band rows *  green 3 x 10       Band extensions *  green 3 x 10       Band no moneys  red 3x10       Band H  abd red 3x10       Supine chin tucks        Prone T, Y, I on ball        Quadruped bird dogs        Prone plank on knees                                                Patient education   CM      Re-evaluation     CM     Ther Act/Gait         Box lift: floor to waist and floor to mat     30 lbs x 20 each  30 lbs x 25 each   Pushing/pulling     mini trampoline x 10 laps  mini trampoline  x 15 laps   Stair navigation     2 flights x 10  2 flights x 10   Truck step simulation (3 risers)      x 20 B                     Modalities         MHP prn

## 2022-03-02 ENCOUNTER — OFFICE VISIT (OUTPATIENT)
Dept: PHYSICAL THERAPY | Facility: REHABILITATION | Age: 57
End: 2022-03-02
Payer: COMMERCIAL

## 2022-03-02 DIAGNOSIS — S22.080D COMPRESSION FRACTURE OF T12 VERTEBRA WITH ROUTINE HEALING, SUBSEQUENT ENCOUNTER: ICD-10-CM

## 2022-03-02 DIAGNOSIS — M54.6 ACUTE THORACIC BACK PAIN, UNSPECIFIED BACK PAIN LATERALITY: ICD-10-CM

## 2022-03-02 DIAGNOSIS — S12.501D CLOSED NONDISPLACED FRACTURE OF SIXTH CERVICAL VERTEBRA WITH ROUTINE HEALING, UNSPECIFIED FRACTURE MORPHOLOGY, SUBSEQUENT ENCOUNTER: Primary | ICD-10-CM

## 2022-03-02 PROCEDURE — 97110 THERAPEUTIC EXERCISES: CPT | Performed by: PHYSICAL THERAPIST

## 2022-03-02 PROCEDURE — 97112 NEUROMUSCULAR REEDUCATION: CPT | Performed by: PHYSICAL THERAPIST

## 2022-03-02 NOTE — PROGRESS NOTES
PT Re-Evaluation     Today's date: 3/2/2022  Patient name: Tobias Guerrero  : 1965  MRN: [id number]  Referring provider: Fan Peterson PA-C  Dx:   Encounter Diagnosis     ICD-10-CM    1  Closed nondisplaced fracture of sixth cervical vertebra with routine healing, unspecified fracture morphology, subsequent encounter  S12 501D    2  Compression fracture of T12 vertebra with routine healing, subsequent encounter  S22 080D    3  Acute thoracic back pain, unspecified back pain laterality  M54 6        Start Time: 1535  Stop Time: 1615  Total time in clinic (min): 40 minutes    Assessment  Assessment details: Patient is a 64 y o  male presenting to reexamination following C6 fracture and T12 compression fracture related to a fall from tree with date of injury 21  Patient exhibits good progress toward objective and functional goals at time of reexamination  Patient exhibits improvements with lifting/carrying capacity, upper and lower quarter strength, and work simulation activities  No focal neurological deficits present at this time and patient exhibits symmetrical sensation, strength, and DTR's  Patient has tolerated work simulation including lifting/carrying and pushing/pulling with no symptom aggravation or adverse responses  Patient achieved FOTO score of 87 indicating a 35 point improvement since prior assessment  Patient has achieved functional goals and is on hold at this time pending physician follow-up regarding return to work           Prognosis: good    Goals  Impairment Goals: 4-6 weeks  - Patient to improve cervical rotation by 10 degrees B/L - MET  - Patient to improve seated posture to [facility] - MET  - Patient to improve range of vision rotating head while driving - MET    Functional Goals: by discharge  - Patient to discharge to independent [facility] - MET  - Patient to increase FOTO to 77 - MET  - Patient to return to work without restrictions - PLANNED  - Patient to return to golfing with minimal limitations - PLANNED  - Patient to deny limitations with head rotation while driving - MET      Plan  Plan details: Plan to place patient on hold pending physician follow-up regarding return to work  Treatment plan discussed with: patient        Subjective Evaluation    History of Present Illness  Mechanism of injury: HISTORY OF PRESENT ILLNESS: Patient is on light duty work with 10 lb  lifting restriction and no BLT  No new sensory or motor changes  Patient notes continued neck pain however has not had any headaches recently  Patient reports he feels ready to return to full duty work and he has done well with work simulation in clinic    PRIOR TREATMENT: cervical collar and TLSO  AGGRAVATING FACTORS: cervical rotation       EASING FACTORS: N/A  WORK:  (light duty)  IMAGING: see chart  FUNCTIONAL LIMITATIONS: light duty work  IMPROVEMENTS: improved tolerance to light duty work, improved ability to maintain upright posture out of brace, cervical mobility, improved confidence in ability to return to full duty work  SUBJECTIVE FUNCTIONAL LEVEL: 90% improvement since PT  PATIENT GOAL: strengthen my core - MET    Pain  Current pain ratin  At best pain ratin  At worst pain ratin  Location: Cervical spine          Objective     Neurological Testing     Sensation   Cervical/Thoracic   Left   Intact: light touch    Right   Intact: light touch    Lumbar   Left   Intact: light touch    Right   Intact: light touch    Reflexes   Left   Goss's reflex: negative    Right   Goss's reflex: negative    Active Range of Motion   Cervical/Thoracic Spine       Cervical    Flexion: 50 degrees   Extension: 41 degrees      Left lateral flexion:  Restriction level: moderate  Right lateral flexion:  Restriction level moderate  Left rotation: 57 degrees  Right rotation: 60 degrees           Lumbar   Flexion:  WFL  Extension:  Restriction level: minimal  Left lateral flexion:  Restriction level: minimal  Right lateral flexion:  Restriction level: minimal  Left rotation:  WFL  Right rotation:  [facility]    Strength/Myotome Testing     Left Hip   Planes of Motion   Flexion: 5    Right Hip   Planes of Motion   Flexion: 5    Left Knee   Extension: 5    Right Knee   Extension: 5    Left Ankle/Foot   Dorsiflexion: 5  Eversion: 5  Great toe extension: 5    Right Ankle/Foot   Dorsiflexion: 5  Eversion: 5  Great toe extension: 5    Additional Strength Details  Cervical myotomal strength 5/5 bilaterally with no focal deficits  No focal lumbar myotomal strength deficits      Flowsheet Rows      Most Recent Value   PT/OT G-Codes    Current Score 87   Projected Score 66              Diagnosis: C6 fracture and T12 compression fracture   Precautions: weaning from cervical collar and TLSO    Primary impairments: cervical mobility deficits, postural dysfunction, and postural strength deficits   *asterisks by exercise = given for HEP    3/2 1/26 2/2 2/17 2/23   Manuals        B UT IASTM        Thoracic ext MWM        Thoracic rot MWM        SOR   8'      Cervical paraspinal STM   10'              There Ex        Elliptical      L3 x 8'   UBE    fwd/back x 6' total  fwd/back x 5' total    Cerv rot AROM against wall *        Cerv flex/ext AROM against wall *        Open book stretch *        LTR on ball        B/L UT stretch on mat        Band golf swing     green x 10    Cervical rotation SNAG *  5" x 10 B               Neuro Re-Ed        Seated scap retractions *        Band rows *        Band extensions *        Band no moneys        Band H  abd        Supine chin tucks        Prone T, Y, I on ball        Quadruped bird dogs        Prone plank on knees                                                Patient education   CM      Re-evaluation  CM   CM     Ther Act/Gait        Box lift: floor to waist and floor to mat     30 lbs x 20 each  30 lbs x 25 each   Pushing/pulling     mini trampoline x 10 laps  mini trampoline  x 15 laps Stair navigation     2 flights x 10  2 flights x 10   Truck step simulation (3 risers)      x 20 B                    Modalities        TAMICA prn

## 2022-04-04 NOTE — PROGRESS NOTES
Patient is discharged to independent [facility] at this time  If further care is necessary a new prescription will be required

## 2022-04-12 NOTE — TELEPHONE ENCOUNTER
Patient called to advise he has dislocated shoulder  I advised this would need to be set into place in the ER

## 2022-04-13 ENCOUNTER — APPOINTMENT (EMERGENCY)
Dept: RADIOLOGY | Facility: HOSPITAL | Age: 57
End: 2022-04-13
Payer: OTHER MISCELLANEOUS

## 2022-04-13 ENCOUNTER — HOSPITAL ENCOUNTER (EMERGENCY)
Facility: HOSPITAL | Age: 57
Discharge: HOME | End: 2022-04-13
Attending: EMERGENCY MEDICINE
Payer: OTHER MISCELLANEOUS

## 2022-04-13 VITALS
RESPIRATION RATE: 18 BRPM | HEART RATE: 99 BPM | TEMPERATURE: 98.3 F | SYSTOLIC BLOOD PRESSURE: 155 MMHG | DIASTOLIC BLOOD PRESSURE: 96 MMHG | OXYGEN SATURATION: 99 % | BODY MASS INDEX: 27.39 KG/M2 | WEIGHT: 190.92 LBS

## 2022-04-13 DIAGNOSIS — M25.511 RIGHT SHOULDER PAIN: Primary | ICD-10-CM

## 2022-04-13 DIAGNOSIS — S49.90XA SHOULDER INJURY: ICD-10-CM

## 2022-04-13 PROCEDURE — 73030 X-RAY EXAM OF SHOULDER: CPT

## 2022-04-13 PROCEDURE — 99283 EMERGENCY DEPT VISIT LOW MDM: CPT

## 2022-04-13 PROCEDURE — 99284 EMERGENCY DEPT VISIT MOD MDM: CPT | Performed by: EMERGENCY MEDICINE

## 2022-04-13 NOTE — ED PROVIDER NOTES
History  Chief Complaint   Patient presents with    Shoulder Pain     Dislocated right shoulder  Unable to put back into place at urgent care  Called ortho who referred pt to ED  19-year-old male presenting over concern for dislocated right shoulder  Patient states he injured his right shoulder at work 2 days and believes he had a popping sensation at that his shoulder was dislocated at that time  States he was seen in an urgent care where he was given a Toradol shot and they attempted to reduce the shoulder unsuccessfully  He said he was initially referred to Orthopedics within referred him to the emergency department  He states all of this occurred yesterday and it was a long and frustrating day so he waited until today to come to the emergency department  Says that he has some pain deep within his shoulder but he is still able to range the shoulder completely  Denies taking anything for symptoms prior to arrival   Denies any new injury or trauma denies any numbness or tingling distally  Prior to Admission Medications   Prescriptions Last Dose Informant Patient Reported? Taking?   acetaminophen (TYLENOL) 325 mg tablet  Self No No   Sig: Take 3 tablets (975 mg total) by mouth every 8 (eight) hours   Patient not taking: Reported on 11/5/2021   calcium carbonate (OS-ELTON) 1250 (500 Ca) MG chewable tablet  Self Yes No   Sig: Chew 1 tablet daily   Patient not taking: Reported on 12/6/2021       Facility-Administered Medications: None       History reviewed  No pertinent past medical history  Past Surgical History:   Procedure Laterality Date    ACHILLES TENDON SURGERY      LAMINECTOMY         History reviewed  No pertinent family history  I have reviewed and agree with the history as documented      E-Cigarette/Vaping    E-Cigarette Use Never User      E-Cigarette/Vaping Substances    Nicotine No     THC No     CBD No     Flavoring No     Other No     Unknown No      Social History Tobacco Use    Smoking status: Current Every Day Smoker     Packs/day: 1 00     Types: Cigarettes    Smokeless tobacco: Never Used   Vaping Use    Vaping Use: Never used   Substance Use Topics    Alcohol use: Not Currently    Drug use: Never        Review of Systems   Constitutional: Negative for fatigue and fever  HENT: Negative for congestion and trouble swallowing  Eyes: Negative for visual disturbance  Respiratory: Negative for cough, chest tightness and shortness of breath  Cardiovascular: Negative for chest pain  Gastrointestinal: Negative for abdominal pain, diarrhea, nausea and vomiting  Genitourinary: Negative for flank pain  Musculoskeletal: Positive for arthralgias  Negative for back pain and gait problem  Skin: Negative for rash and wound  Neurological: Negative for syncope and headaches  Psychiatric/Behavioral: Negative for agitation  All other systems reviewed and are negative  Physical Exam  ED Triage Vitals [04/13/22 0909]   Temperature Pulse Respirations Blood Pressure SpO2   98 3 °F (36 8 °C) 102 18 157/98 99 %      Temp Source Heart Rate Source Patient Position - Orthostatic VS BP Location FiO2 (%)   Oral Monitor Sitting Right arm --      Pain Score       5             Orthostatic Vital Signs  Vitals:    04/13/22 0909 04/13/22 1114   BP: 157/98 155/96   Pulse: 102 99   Patient Position - Orthostatic VS: Sitting Lying       Physical Exam  Vitals and nursing note reviewed  Constitutional:       Appearance: He is well-developed  He is not diaphoretic  HENT:      Head: Normocephalic and atraumatic  Right Ear: External ear normal       Left Ear: External ear normal    Eyes:      General:         Right eye: No discharge  Left eye: No discharge  Conjunctiva/sclera: Conjunctivae normal    Neck:      Vascular: No JVD  Trachea: No tracheal deviation  Cardiovascular:      Rate and Rhythm: Normal rate and regular rhythm        Heart sounds: Normal heart sounds  Pulmonary:      Effort: Pulmonary effort is normal       Breath sounds: Normal breath sounds  No wheezing  Abdominal:      General: There is no distension  Musculoskeletal:         General: Tenderness present  No swelling  Normal range of motion  Cervical back: Normal range of motion  Comments: The patient has some tenderness to anterior shoulder, but is able to range it completely, touch her opposite shoulder, full range of motion in all directions  Sensation intact in equal bilaterally distal pulses intact  Skin:     General: Skin is warm and dry  Neurological:      Mental Status: He is alert and oriented to person, place, and time  Psychiatric:         Mood and Affect: Mood normal          Speech: Speech normal          Behavior: Behavior normal          ED Medications  Medications - No data to display    Diagnostic Studies  Results Reviewed     None                 XR shoulder 2+ vw right   Final Result by Comfort Nova MD (04/13 1242)   Mild degenerative changes right AC joint      No acute osseous abnormality  Workstation performed: [id number]               Procedures  Procedures      ED Course                             SBIRT 22yo+      Most Recent Value   SBIRT (24 yo +)    In order to provide better care to our patients, we are screening all of our patients for alcohol and drug use  Would it be okay to ask you these screening questions? No Filed at: 04/13/2022 0935                [facility]  Number of Diagnoses or Management Options  Right shoulder pain  Shoulder injury  Diagnosis management comments: Some arthritic changes seen on x-ray but no sign of dislocation  Patient provided outpatient films which were uploaded to PACS  On review no sign of dislocated shoulder on any of prior x-rays  Unclear if there were prior images and a successful reduction or if shoulder was never truly dislocated    Patient advised to follow-up with orthopedics due to his persistent shoulder pain as there may still be an underlying labral or rotator cuff injury  Declined any pain medication  Discharged stable condition      Disposition  Final diagnoses:   Right shoulder pain   Shoulder injury     Time reflects when diagnosis was documented in both MDM as applicable and the Disposition within this note     Time User Action Codes Description Comment    4/13/2022 10:45 AM Everardo Kelly Add [M25 511] Right shoulder pain     4/13/2022 10:45 AM Everardo Kelly Add [S49 90XA] Shoulder injury       ED Disposition     ED Disposition Condition Date/Time Comment    Discharge Stable Wed Apr 13, 2022 10:45 AM Schultzcamryn Mckeon discharge to home/self care  Follow-up Information     Follow up With Specialties Details Why Contact Info Additional Information     [facility]   [address] [id number]  [phone] [address] [address] TA[address] CAST, [address] [place] [address], [address]  Use Entrance A           Discharge Medication List as of 4/13/2022 10:45 AM      CONTINUE these medications which have NOT CHANGED    Details   acetaminophen (TYLENOL) 325 mg tablet Take 3 tablets (975 mg total) by mouth every 8 (eight) hours, Starting Tue 9/21/2021, No Print      calcium carbonate (OS-ELTON) 1250 (500 Ca) MG chewable tablet Chew 1 tablet daily, Historical Med           No discharge procedures on file  PDMP Review     None           ED Provider  Attending physically available and evaluated Antoine Mckeon  BERRY managed the patient along with the ED Attending      Electronically Signed by         Eulalia Maza DO  04/13/22 8206

## 2022-04-13 NOTE — ED ATTENDING ATTESTATION
4/13/2022  Stevan SMART, saw and evaluated the patient  I have discussed the patient with the resident/non-physician practitioner and agree with the resident's/non-physician practitioner's findings, Plan of Care, and MDM as documented in the resident's/non-physician practitioner's note, except where noted  All available labs and Radiology studies were reviewed  I was present for key portions of any procedure(s) performed by the resident/non-physician practitioner and I was immediately available to provide assistance  At this point I agree with the current assessment done in the Emergency Department  I have conducted an independent evaluation of this patient a history and physical is as follows: A 62year old male with no significant past medical history; presents with concern for right shoulder dislocation  Patient states he was at work two days ago when he felt a pop in his right shoulder, followed by significant pain and decreased range of motion  Patient was seen yesterday at [facility] who did an x-ray, reporting that his shoulder was dislocated  After several attempts at manual reduction day were unsuccessful, patient was discharged home and recommended to follow-up with orthopedics  Patient called Ortho who sent him to the ED for further evaluation  Today patient reports persistent right shoulder pain, although has return in his range of motion  Patient denies associated paresthesias and weakness  Patient states he has otherwise been well; denying recent fever, chills, chest pain, shortness of breath, abdominal pain, nausea, vomiting, diarrhea, peripheral edema and rashes      Physical Exam  General Appearance: alert and oriented, nad, non toxic appearing  Skin:  Warm, dry, intact  HEENT: atraumatic, normocephalic  Neck: Supple, trachea midline  Cardiac: RRR; no murmurs, rub, gallops  Pulmonary: lungs CTAB; no wheezes, rales, rhonchi  Gastrointestinal: abdomen soft, nontender, nondistended; no guarding or rebound tenderness; good bowel sounds, no mass or bruits  Extremities:  Right shoulder nontender with full active and passive range of motion  Sensation intact throughout, 5/5 motor strength  No pedal edema, 2+ pulses; no calf tenderness, no clubbing, no cyanosis  Neuro:  no focal motor or sensory deficits, CN 2-12 grossly intact  Psych:  Normal mood and affect, normal judgement and insight    Assessment and Plan:  Right shoulder pain, concern for shoulder dislocation however on exam today patient's shoulder is not dislocated  Shoulder possibly spontaneously reduced  Will obtain new XR today  Will upload [facility] XR into PACS for review      ED Course  ED Course as of 04/13/22 1052   Wed Apr 13, 2022   1014 Reviewed imaging from [facility] - no acute dislocation or claudette abnormalities         Critical Care Time  Procedures
